# Patient Record
Sex: MALE | Race: WHITE | HISPANIC OR LATINO | Employment: FULL TIME | URBAN - METROPOLITAN AREA
[De-identification: names, ages, dates, MRNs, and addresses within clinical notes are randomized per-mention and may not be internally consistent; named-entity substitution may affect disease eponyms.]

---

## 2017-02-08 DIAGNOSIS — Z00.00 ROUTINE GENERAL MEDICAL EXAMINATION AT A HEALTH CARE FACILITY: Primary | ICD-10-CM

## 2017-04-13 ENCOUNTER — CLINICAL SUPPORT (OUTPATIENT)
Dept: INTERNAL MEDICINE | Facility: CLINIC | Age: 47
End: 2017-04-13
Payer: COMMERCIAL

## 2017-04-13 ENCOUNTER — OFFICE VISIT (OUTPATIENT)
Dept: INTERNAL MEDICINE | Facility: CLINIC | Age: 47
End: 2017-04-13
Payer: COMMERCIAL

## 2017-04-13 ENCOUNTER — HOSPITAL ENCOUNTER (OUTPATIENT)
Dept: CARDIOLOGY | Facility: CLINIC | Age: 47
Discharge: HOME | End: 2017-04-13
Payer: COMMERCIAL

## 2017-04-13 VITALS
SYSTOLIC BLOOD PRESSURE: 114 MMHG | WEIGHT: 176.56 LBS | HEART RATE: 60 BPM | DIASTOLIC BLOOD PRESSURE: 68 MMHG | BODY MASS INDEX: 27.66 KG/M2

## 2017-04-13 DIAGNOSIS — Z00.00 ROUTINE GENERAL MEDICAL EXAMINATION AT A HEALTH CARE FACILITY: Primary | ICD-10-CM

## 2017-04-13 DIAGNOSIS — Z80.42 FAMILY HISTORY OF PROSTATE CANCER: ICD-10-CM

## 2017-04-13 DIAGNOSIS — Z00.00 ROUTINE GENERAL MEDICAL EXAMINATION AT A HEALTH CARE FACILITY: ICD-10-CM

## 2017-04-13 LAB
ALBUMIN SERPL BCP-MCNC: 4 G/DL
ALP SERPL-CCNC: 93 U/L
ALT SERPL W/O P-5'-P-CCNC: 61 U/L
ANION GAP SERPL CALC-SCNC: 11 MMOL/L
AST SERPL-CCNC: 32 U/L
BILIRUB SERPL-MCNC: 0.8 MG/DL
BUN SERPL-MCNC: 15 MG/DL
CALCIUM SERPL-MCNC: 9.5 MG/DL
CHLORIDE SERPL-SCNC: 105 MMOL/L
CHOLEST/HDLC SERPL: 4.3 {RATIO}
CO2 SERPL-SCNC: 27 MMOL/L
COMPLEXED PSA SERPL-MCNC: 0.82 NG/ML
CREAT SERPL-MCNC: 1.2 MG/DL
ERYTHROCYTE [DISTWIDTH] IN BLOOD BY AUTOMATED COUNT: 12.9 %
EST. GFR  (AFRICAN AMERICAN): >60 ML/MIN/1.73 M^2
EST. GFR  (NON AFRICAN AMERICAN): >60 ML/MIN/1.73 M^2
ESTIMATED AVG GLUCOSE: 111 MG/DL
GLUCOSE SERPL-MCNC: 102 MG/DL
HBA1C MFR BLD HPLC: 5.5 %
HCT VFR BLD AUTO: 45.8 %
HDL/CHOLESTEROL RATIO: 23.4 %
HDLC SERPL-MCNC: 201 MG/DL
HDLC SERPL-MCNC: 47 MG/DL
HGB BLD-MCNC: 15.8 G/DL
LDLC SERPL CALC-MCNC: 123.8 MG/DL
MCH RBC QN AUTO: 30.9 PG
MCHC RBC AUTO-ENTMCNC: 34.5 %
MCV RBC AUTO: 90 FL
NONHDLC SERPL-MCNC: 154 MG/DL
PLATELET # BLD AUTO: 200 K/UL
PMV BLD AUTO: 10.1 FL
POTASSIUM SERPL-SCNC: 4.2 MMOL/L
PROT SERPL-MCNC: 7.5 G/DL
RBC # BLD AUTO: 5.11 M/UL
SODIUM SERPL-SCNC: 143 MMOL/L
TRIGL SERPL-MCNC: 151 MG/DL
TSH SERPL DL<=0.005 MIU/L-ACNC: 2.21 UIU/ML
WBC # BLD AUTO: 6.07 K/UL

## 2017-04-13 PROCEDURE — 83036 HEMOGLOBIN GLYCOSYLATED A1C: CPT

## 2017-04-13 PROCEDURE — 97802 MEDICAL NUTRITION INDIV IN: CPT | Mod: S$GLB,,, | Performed by: INTERNAL MEDICINE

## 2017-04-13 PROCEDURE — 93000 ELECTROCARDIOGRAM COMPLETE: CPT | Mod: S$GLB,,, | Performed by: INTERNAL MEDICINE

## 2017-04-13 PROCEDURE — 85027 COMPLETE CBC AUTOMATED: CPT

## 2017-04-13 PROCEDURE — 99386 PREV VISIT NEW AGE 40-64: CPT | Mod: S$GLB,,, | Performed by: INTERNAL MEDICINE

## 2017-04-13 PROCEDURE — 97750 PHYSICAL PERFORMANCE TEST: CPT | Mod: S$GLB,,, | Performed by: INTERNAL MEDICINE

## 2017-04-13 PROCEDURE — 84153 ASSAY OF PSA TOTAL: CPT

## 2017-04-13 PROCEDURE — 84443 ASSAY THYROID STIM HORMONE: CPT

## 2017-04-13 PROCEDURE — 80061 LIPID PANEL: CPT

## 2017-04-13 PROCEDURE — 80053 COMPREHEN METABOLIC PANEL: CPT

## 2017-04-13 PROCEDURE — 99999 PR PBB SHADOW E&M-EST. PATIENT-LVL III: CPT | Mod: PBBFAC,,, | Performed by: INTERNAL MEDICINE

## 2017-04-13 NOTE — PROGRESS NOTES
Subjective:       Patient ID: Enrique Jimenez is a 46 y.o. male.    Chief Complaint: Executive Health    HPIPleasant gentleman from [facility] here for his Executive Health exam. Overall doing well and had no specific complaints. We discussed the fact that his brother was recently diagnosed and treated for prostate cancer. His father also had this issue as well. Both have done well, but I stressed the importance of him getting yearly exams in this area. He denies any lower urinary symptoms including decreased stream. His PSA today was stable at 0.82 that represents no change from previous study. I gave him copies of his other blood work that showed a slight increase in ALT at 61 - new finding and cholesterol/triglycerides at 201/151 respectively - increased from last year. He reports not being as active physically this year 2o time constraints, but will make an effort to improve this. All other parameters were within normal limits including EKG.  Review of Systems   Constitutional: Negative for activity change, appetite change, fatigue and unexpected weight change.   HENT: Negative.    Eyes: Negative for visual disturbance.   Respiratory: Negative for cough, shortness of breath and wheezing.    Cardiovascular: Negative for chest pain, palpitations and leg swelling.   Gastrointestinal: Negative for abdominal distention, abdominal pain and blood in stool.   Endocrine: Negative.    Genitourinary: Negative for decreased urine volume, difficulty urinating and urgency.   Musculoskeletal: Negative for arthralgias, back pain and joint swelling.        Occasional heel pain.   Skin: Negative.    Neurological: Negative for dizziness, weakness, light-headedness and headaches.   Hematological: Negative.        Objective:      Physical Exam   Constitutional: He is oriented to person, place, and time. He appears well-developed and well-nourished. No distress.   HENT:   Head: Normocephalic and atraumatic.   Right Ear: External ear  normal.   Left Ear: External ear normal.   Mouth/Throat: Oropharynx is clear and moist. No oropharyngeal exudate.   Eyes: Conjunctivae and EOM are normal. Pupils are equal, round, and reactive to light. Right eye exhibits no discharge. Left eye exhibits no discharge. No scleral icterus.   Neck: Normal range of motion. Neck supple. No JVD present. No thyromegaly present.   Cardiovascular: Normal rate, regular rhythm, normal heart sounds and intact distal pulses.    No murmur heard.  Pulmonary/Chest: Effort normal and breath sounds normal. No respiratory distress. He has no wheezes. He exhibits no tenderness.   Abdominal: Soft. He exhibits no distension and no mass.   Musculoskeletal: Normal range of motion. He exhibits no edema or tenderness.   Lymphadenopathy:     He has no cervical adenopathy.   Neurological: He is alert and oriented to person, place, and time. No cranial nerve deficit. Coordination normal.   Skin: Skin is warm and dry. No rash noted. He is not diaphoretic. No erythema.   Psychiatric: He has a normal mood and affect. His behavior is normal. Judgment and thought content normal.   Nursing note and vitals reviewed.      Assessment:       1. Routine general medical examination at a health care facility    2. Family history of prostate cancer     3.    Out-of-range blood work as detailed above.   Plan:    1. Repeat abnormal labs in 1 month for comparison.         2. Repeat PSA in 1 year.         3. Return to clinic in 1 year or sooner as indicated above.

## 2017-04-13 NOTE — PROGRESS NOTES
Subjective:       Patient ID: Enrique Jimenez is a 46 y.o. male.    Chief Complaint: No chief complaint on file.    HPI   Mr. Jimenez has reported no previous history of cardiovascular or pulmonary disease. He has reported no physical limitations to exercise. He currently runs/walks 2 days per week for 50-60 minutes. Mr. Jimenez would like to avoid high impact activity because his knee does hurt after multiple days of running. He also avoids heavy continuous overhead lifting to avoid aggravation of his right shoulder.     Review of Systems    Objective:      The fitness evaluation results are as follows:    D.O.S. 4/13/2017 3/18/2016   Height (in): 67.5 68   Weight (lbs): 175 174   BMI: 27.68117 26.399033   Body Fat (%): 25.41 27.50   Waist (cm): 92 92   Hip (cm): 98 99   WHR: 0.94 0.93   RBP (mmHg): 126/80 118/76   RHR (bpm): 52 48    Strength R (lbs)t: 122 100    Strength Lt (lbs): 127 93.699725   Push-up Assessment: 40 37   Curl-up Assessment: 33 20   Flexibility Testing (cm): 29 30   REE (kcals): 1450 1460       Physical Exam    Assessment:      Age/Gender Stratified Assessment:       Heart Rate: Normal   Resting BP: Normal   Body Fat %: Fair   WHR Risk Factor: Low Risk    Strength R: Average    Strength L: Average   Upper Body Endurance: Excellent   Abdominal Endurance: Below Average   Lower body Flexibility: Very Good       1. Routine general medical examination at a health care facility        Plan:    Mr. Jimenez should continue with his current exercise routine striving to reach 150 minutes of moderate intensity aerobic exercise each week. He should begin resistance training to help increase his muscular strength and endurance. There was an increase in both endurance and strength that is associated with being more active in the previous months. Daily stretching should also be performed to increase level of flexibility. Mr. Jimenez should focus on maintaining his current fitness  level over the next year. A custom gym routine was created for Jb Jimenez to try in order to help keep him interested in working out.

## 2017-04-20 NOTE — PROGRESS NOTES
Nutrition Assessment  Client name:  Enrique Jimenez  :  1970  Age:  46 y.o.  Gender:  male    Client states: he met with another staff RD last year. Originally from [facility];  with three sons, ages 11, 14, and 16.  Has a hx of mixed hyperlipidemia. Last year he was running for exercise, but his knees now hurt, and has replaced it with walking and in future plans to ride his bike. His 16 year old son plays soccer and when he drops him off for practice he will walk/run for an hour. Additionally his son participates in traveling soccer and that promotes eating fast food on the weekend. On a positive note, since last year he has reduced sugar in beverages, drinking less oj, mainly water and prior to December had lost 6# and has maintained. He shares that he may be overeating peanuts, loves all kinds of chocolate, but has reduced amount/frequency Once his lipids were reviewed, he assessed that the contributors are: cheese, pastries and french fries. Stress from his job is high, working long hours and having survived the third round of layoffs. Today he is interested in losing a few additional pounds, getting back to a routine of exercise, and lowering his increased lipids.        Past Medical History:   Diagnosis Date    Mixed hyperlipidemia 2014       Social History    Marital status:    Employment:  [employer]  Social History   Substance Use Topics    Smoking status: Never Smoker    Smokeless tobacco: Not on file    Alcohol use Yes      Comment: Socially        Current medications:  has a current medication list which includes the following prescription(s): multivit-iron-min-folic acid and multivitamin.  Vitamins, minerals, and/or supplements:  Centrum, Vitamin D and Lecithin   Food allergies or intolerances: none     Food History  Breakfast:  ½ ham and cheese sandwich on whole grain bread  Or oatmeal + water  Lunch: 2x/wk restaurant: fish or burger with fries or office provided lunch +  "water or un sweet tea  Mid-afternoon Snack: (fruit available at work) banana/pears  Mid-afternoon Snack: 2 handfuls of shelled  Peanuts   Dinner: Turkey burger with quinoa and steamed vegetables  H.S. Snack:  Chocolate or homemade cake, cookies or Oreo's 3x/wk    Exercise History:  1x/wk resistance training 10 minutes + 1 hr.(25 minutes running, 35 minutes walking) 2x/wk    Lab Reports   Total Cholesterol:  201    Triglycerides:  151  HDL:  47  LDL:  125.8   Glucose:  102  HbA1c:  In process  BP:  126/80     Weight History  Height:  5'7.5"     Weight:  175  BMI:  27.06  % Body Fat:  25.41    Diagnosis  RMR (Method:  Body Kaleva):  1450 kcal  Activity Factor:  1.3  PAIGE:  1855 - 125 = 1730    Altered nutrition related laboratory values related to food choices as evidenced by Cholesterol: 201 and Triglyceride: 151    Intervention    Goals:  1.  Goal wt: 172# loss of 4#  2.  Purchase bike and ride 2x/wk for 45 minutes  3.  Chooses to reduce amount of restaurant food ordered/eaten not the choice of food  4.  Reduce Chol: <200 and Trig: < 151 and LDL <123    Discussed the benefits of regular exercise on lipids and fat loss. Explained the difference in fat of regular and light or reduced fat cheeses and how to read labels and brands of cheese to purchase. Reviewed what is a serving of various types of nuts and encouraged eating them however is measured portions according to package Explained additions to the "healthy" Fast Food guide to use while traveling and provided Healthy Travel snack guide. Offered and client accepted recipe for "perfect cookie" to replace current cookies. Discussed the benefits of 70% cacao chocolate how to distinguish and brands to seek. Encouraged  developing a task list to assure completing exercise.    Handouts provided:  Meal Planning Guide  Restaurant Guide  Eat Fit Shopping List  Eat Fit Nathalia  Fast Food Guide  Vitamin/Mineral Guide  Healthy Travel Snack Guide  Perfect Cookie " recipe    Monitoring/Evaluation    Monitor the following:  Weight  BMI  % Body Fat  Caloric intake  Labs:  Lipids    Follow Up Plan:  Follow up with client in 1-2 years

## 2017-08-04 ENCOUNTER — CLINICAL SUPPORT (OUTPATIENT)
Dept: INTERNAL MEDICINE | Facility: CLINIC | Age: 47
End: 2017-08-04
Payer: COMMERCIAL

## 2017-08-04 DIAGNOSIS — R74.01 ELEVATED AST (SGOT): Primary | ICD-10-CM

## 2017-08-04 LAB
ALBUMIN SERPL BCP-MCNC: 3.8 G/DL
ALP SERPL-CCNC: 91 U/L
ALT SERPL W/O P-5'-P-CCNC: 31 U/L
AST SERPL-CCNC: 23 U/L
BILIRUB DIRECT SERPL-MCNC: 0.3 MG/DL
BILIRUB SERPL-MCNC: 0.8 MG/DL
PROT SERPL-MCNC: 7.4 G/DL

## 2017-08-04 PROCEDURE — 36415 COLL VENOUS BLD VENIPUNCTURE: CPT

## 2017-08-04 PROCEDURE — 80076 HEPATIC FUNCTION PANEL: CPT

## 2018-02-15 ENCOUNTER — OFFICE VISIT (OUTPATIENT)
Dept: UROLOGY | Facility: CLINIC | Age: 48
End: 2018-02-15
Payer: COMMERCIAL

## 2018-02-15 VITALS
DIASTOLIC BLOOD PRESSURE: 88 MMHG | HEART RATE: 70 BPM | HEIGHT: 68 IN | BODY MASS INDEX: 27.06 KG/M2 | WEIGHT: 178.56 LBS | SYSTOLIC BLOOD PRESSURE: 151 MMHG

## 2018-02-15 DIAGNOSIS — N48.6 PEYRONIE'S DISEASE: Primary | ICD-10-CM

## 2018-02-15 DIAGNOSIS — N52.01 ERECTILE DYSFUNCTION DUE TO ARTERIAL INSUFFICIENCY: ICD-10-CM

## 2018-02-15 PROBLEM — Z80.42 FAMILY HISTORY OF PROSTATE CANCER: Status: RESOLVED | Noted: 2017-04-13 | Resolved: 2018-02-15

## 2018-02-15 PROCEDURE — 99204 OFFICE O/P NEW MOD 45 MIN: CPT | Mod: S$GLB,,, | Performed by: UROLOGY

## 2018-02-15 PROCEDURE — 99999 PR PBB SHADOW E&M-EST. PATIENT-LVL III: CPT | Mod: PBBFAC,,, | Performed by: UROLOGY

## 2018-02-15 PROCEDURE — 3008F BODY MASS INDEX DOCD: CPT | Mod: S$GLB,,, | Performed by: UROLOGY

## 2018-02-15 RX ORDER — SILDENAFIL CITRATE 20 MG/1
TABLET ORAL
Qty: 50 TABLET | Refills: 11 | Status: SHIPPED | OUTPATIENT
Start: 2018-02-15 | End: 2018-05-10

## 2018-02-15 RX ORDER — PENTOXIFYLLINE 400 MG/1
400 TABLET, EXTENDED RELEASE ORAL
Qty: 90 TABLET | Refills: 9 | Status: SHIPPED | OUTPATIENT
Start: 2018-02-15 | End: 2019-09-16

## 2018-02-15 NOTE — LETTER
February 15, 2018      Jason Pruitt MD  [address] [facility] [address]           [facility] - Urology 4th Floor  [address]  [place] [address]  Phone: [phone]          Patient: Enrique Jimenez   MR Number: [id number]   YOB: 1970   Date of Visit: 2/15/2018       Dear Dr. Jason Pruitt:    Thank you for referring Enrique Jimenez to me for evaluation. Attached you will find relevant portions of my assessment and plan of care.    If you have questions, please do not hesitate to call me. I look forward to following Enrique Jimenez along with you.    Sincerely,    Renzo Prakash MD    Enclosure  CC:  No Recipients    If you would like to receive this communication electronically, please contact [email or web address] or [phone] to request more information on tribr Link access.    For providers and/or their staff who would like to refer a patient to [facility], please contact us through our one-stop-shop provider referral line, [facility] [place], operated [facility], at [phone].    If you feel you have received this communication in error or would no longer like to receive these types of communications, please e-mail [email or web address]

## 2018-02-15 NOTE — PATIENT INSTRUCTIONS
What Is Peyronies Disease?  A slight natural curve to the erect penis is usually normal. But curvature that causes pain and difficulty with intercourse is a problem. The development of painful curvature is called Peyronies disease. Peyronies disease is due to a plaque (scar) that forms inside the penis.    Your Penile Anatomy  The shaft (body) of the penis consists of the corpora cavernosa, two columns of spongy tissue. During an erection, this tissue fills with blood, swells, and becomes rigid, creating an erection. A dense sheath of elastic tissue called the tunica surrounds the corpora. This tissue stretches as the penis becomes erect.    Painful Curvature  Peyronies disease occurs when a plaque (scar) develops on the fibrous sheath of tissue surrounding the corpora. The scar can form on any part of the penis, but often is found on the top or bottom. The scarred area of the tunica loses its elasticity, and so doesnt stretch when the corpora swell. Because the tunica doesnt stretch in that area, the erect penis curves in the direction of the scar.  Possible Causes   No one is sure just what causes the plaque. It may be the result of an injury to the erect penis or a blow to the groin. The plaque may also occur because of a problem with your immune system, which normally helps your body fight disease. Or the plaque may form for other, still unknown, reasons. It is certain, however, that Peyronies disease is not caused by sexually transmitted diseases and is not cancer.    Symptoms of Peyronies Disease  · Curvature of the penis during erection (which may interfere with intercourse)  · Pain during erection  · Soft erections  · Shortening or narrowing of the penis  A hard area is usually felt below the skin of the penis in the area of the plaque.  © 6588-9695 Joanne [employer]ie, [address] [place] [address], [place] [address]. All rights reserved. This information is not intended as a substitute for professional  medical care. Always follow your healthcare professional's instructions.

## 2018-02-15 NOTE — PROGRESS NOTES
CHIEF COMPLAINT:    Mr. Jimenez is a 47 y.o. male presenting for a consultation at the request of Dr. Pruitt. Patient presents with peyronie's disease    PRESENTING ILLNESS:    Enrique Jimenez is a 47 y.o. male who c/o peyronie's disease.  It's been present for 2 months.  He has ~ a 45 degree ventral curve.  He also notes that his penis is not as firm distally.  It's difficult to penetrate because of the ED, but he doesn't think it's that difficult because of the curve.  Sex is not painful for him or his wife.     He has no voiding complaints.  No hematuria.  No dysuria.    REVIEW OF SYSTEMS:    Enrique Jimenez denies headache, blurred vision, fever, nausea, vomiting, chills, abdominal pain, bleeding per rectum, cough, SOB, recent loss of consciousness, recent mental status changes, seizures, dizziness, or upper or lower extremity weakness.    YAJAIRA  1. 4  2. 5  3. 5  4. 5  5. 5      PATIENT HISTORY:    Past Medical History:   Diagnosis Date    Family history of prostate cancer 4/13/2017    Father and brother.    Mixed hyperlipidemia 11/25/2014       Past Surgical History:   Procedure Laterality Date    LIPOMA RESECTION         Family History   Problem Relation Age of Onset    Cancer Father 77     prostate cancer    Cancer Brother 58     Prostate cancer.       Social History     Social History    Marital status:      Spouse name: N/A    Number of children: N/A    Years of education: N/A     Occupational History    Not on file.     Social History Main Topics    Smoking status: Never Smoker    Smokeless tobacco: Not on file    Alcohol use Yes      Comment: Socially    Drug use: No    Sexual activity: Not on file     Other Topics Concern    Not on file     Social History Narrative    No narrative on file       Allergies:  Patient has no known allergies.    Medications:    Current Outpatient Prescriptions:     multivitamin (ONE DAILY MULTIVITAMIN) per tablet, Take 1 tablet by mouth once  daily., Disp: , Rfl:     PHYSICAL EXAMINATION:    The patient generally appears in good health, is appropriately interactive, and is in no apparent distress.     Eyes: anicteric sclerae, moist conjunctivae; no lid-lag; PERRLA     HENT: Atraumatic; oropharynx clear with moist mucous membranes and no mucosal ulcerations;normal hard and soft palate.  No evidence of lymphadenopathy.    Neck: Trachea midline.  No thyromegaly.    Musculoskeletal: No abnormal gait.    Skin: No lesions.    Mental: Cooperative with normal affect.  Is oriented to time, place, and person.    Neuro: Grossly intact.    Chest: Normal inspiratory effort.   No accessory muscles.  No audible wheezes.  Respirations symmetric on inspiration and expiration.    Heart: Regular rhythm.      Abdomen:  Soft, non-tender. No masses or organomegaly. Bladder is not palpable. No evidence of flank discomfort. No evidence of inguinal hernia.    Genitourinary: The penis is not circumcised with a plaque c/w peyronie's on the shaft. The urethral meatus is normal. The testes, epididymides, and cord structures are normal in size and contour bilaterally. The scrotum is normal in size and contour.    Normal anal sphincter tone. No rectal mass.    The prostate is 30 g. Normal landmarks. Lateral sulci. Median furrow intact.  No nodularity or induration. Seminal vesicles are normal.    Extremities: No clubbing, cyanosis, or edema      LABS:      Lab Results   Component Value Date    PSA 0.82 04/13/2017    PSA 0.81 03/18/2016    PSA 0.62 10/25/2013       IMPRESSION:    Encounter Diagnoses   Name Primary?    Peyronie's disease Yes    Erectile dysfunction due to arterial insufficiency          PLAN:    1. We discussed the pathophysiology of Peyronie's disease.  We discussed both the acute and chronic phase of the disease.  Because it has only been present for 2 months, I discussed that I feel he is in the acute phase of the disease.  While he is in the acute phase I would  recommend Trental.  Risks and benefits discussed.  Side effects discussed.  A new Rx for Trental was given today.   2. Will use generic sildenafil for the ED. Side effects discussed.  A new Rx was given  3. RTC 10 months.    Copy to: Sonal

## 2018-05-10 ENCOUNTER — OFFICE VISIT (OUTPATIENT)
Dept: INTERNAL MEDICINE | Facility: CLINIC | Age: 48
End: 2018-05-10
Payer: COMMERCIAL

## 2018-05-10 ENCOUNTER — CLINICAL SUPPORT (OUTPATIENT)
Dept: INTERNAL MEDICINE | Facility: CLINIC | Age: 48
End: 2018-05-10
Payer: COMMERCIAL

## 2018-05-10 VITALS
TEMPERATURE: 98 F | HEART RATE: 52 BPM | DIASTOLIC BLOOD PRESSURE: 82 MMHG | WEIGHT: 178 LBS | BODY MASS INDEX: 27.06 KG/M2 | SYSTOLIC BLOOD PRESSURE: 128 MMHG

## 2018-05-10 DIAGNOSIS — Z00.00 ROUTINE GENERAL MEDICAL EXAMINATION AT A HEALTH CARE FACILITY: Primary | ICD-10-CM

## 2018-05-10 LAB
ALBUMIN SERPL BCP-MCNC: 4.1 G/DL
ALP SERPL-CCNC: 88 U/L
ALT SERPL W/O P-5'-P-CCNC: 31 U/L
ANION GAP SERPL CALC-SCNC: 10 MMOL/L
AST SERPL-CCNC: 21 U/L
BILIRUB SERPL-MCNC: 0.7 MG/DL
BUN SERPL-MCNC: 15 MG/DL
CALCIUM SERPL-MCNC: 9.7 MG/DL
CHLORIDE SERPL-SCNC: 106 MMOL/L
CHOLEST SERPL-MCNC: 191 MG/DL
CHOLEST/HDLC SERPL: 4.3 {RATIO}
CO2 SERPL-SCNC: 27 MMOL/L
COMPLEXED PSA SERPL-MCNC: 0.93 NG/ML
CREAT SERPL-MCNC: 1.2 MG/DL
ERYTHROCYTE [DISTWIDTH] IN BLOOD BY AUTOMATED COUNT: 12.1 %
EST. GFR  (AFRICAN AMERICAN): >60 ML/MIN/1.73 M^2
EST. GFR  (NON AFRICAN AMERICAN): >60 ML/MIN/1.73 M^2
ESTIMATED AVG GLUCOSE: 105 MG/DL
GLUCOSE SERPL-MCNC: 98 MG/DL
HBA1C MFR BLD HPLC: 5.3 %
HCT VFR BLD AUTO: 47.3 %
HDLC SERPL-MCNC: 44 MG/DL
HDLC SERPL: 23 %
HGB BLD-MCNC: 15.8 G/DL
LDLC SERPL CALC-MCNC: 115.8 MG/DL
MCH RBC QN AUTO: 30.6 PG
MCHC RBC AUTO-ENTMCNC: 33.4 G/DL
MCV RBC AUTO: 92 FL
NONHDLC SERPL-MCNC: 147 MG/DL
PLATELET # BLD AUTO: 227 K/UL
PMV BLD AUTO: 10.2 FL
POTASSIUM SERPL-SCNC: 4.4 MMOL/L
PROT SERPL-MCNC: 7.5 G/DL
RBC # BLD AUTO: 5.16 M/UL
SODIUM SERPL-SCNC: 143 MMOL/L
TRIGL SERPL-MCNC: 156 MG/DL
TSH SERPL DL<=0.005 MIU/L-ACNC: 1.53 UIU/ML
WBC # BLD AUTO: 5.59 K/UL

## 2018-05-10 PROCEDURE — 84443 ASSAY THYROID STIM HORMONE: CPT

## 2018-05-10 PROCEDURE — 85027 COMPLETE CBC AUTOMATED: CPT

## 2018-05-10 PROCEDURE — 84153 ASSAY OF PSA TOTAL: CPT

## 2018-05-10 PROCEDURE — 97750 PHYSICAL PERFORMANCE TEST: CPT | Mod: S$GLB,,, | Performed by: INTERNAL MEDICINE

## 2018-05-10 PROCEDURE — 99396 PREV VISIT EST AGE 40-64: CPT | Mod: S$GLB,,, | Performed by: INTERNAL MEDICINE

## 2018-05-10 PROCEDURE — 80061 LIPID PANEL: CPT

## 2018-05-10 PROCEDURE — 99999 PR PBB SHADOW E&M-EST. PATIENT-LVL III: CPT | Mod: PBBFAC,,, | Performed by: INTERNAL MEDICINE

## 2018-05-10 PROCEDURE — 80053 COMPREHEN METABOLIC PANEL: CPT

## 2018-05-10 PROCEDURE — 83036 HEMOGLOBIN GLYCOSYLATED A1C: CPT

## 2018-05-10 PROCEDURE — 97802 MEDICAL NUTRITION INDIV IN: CPT | Mod: S$GLB,,, | Performed by: INTERNAL MEDICINE

## 2018-05-10 NOTE — PROGRESS NOTES
Nutrition Assessment  This is a general nutrition consultation as per the contractual agreement of the client employer's insurance provider.    Client name:  Enrique Jimenez  :  1970  Age:  47 y.o.  Gender:  male    Client states: Originally from [facility];  with three sons, ages 12, 15, and 17.  Has a hx of mixed hyperlipidemia. Last year he was running for exercise, but experiences knee pain and has reduced running time and frequency and accomplished one of last years goals of replacing it with biking. In the last 3 weeks he has not gone to the gym due to pain in his neck and shoulder. The MD visit revealed sore muscles and tendons probably related to resistance training. Last year he was eating peanuts, chocolate, cheese, pastries and french fries all which he enjoys, however in January he with the assistance of is wife, Francia, disciplined himself to eat less snacks, bring lunch to work 3x/wk, and a lighter dinner meals. These changes resulted in a 5# wt. He discontinued taking Lecithin as he did not see benefits.  Stress from his job continues and he has survived the fourth round of layoffs and re-organization. Today he has no specific nutrition questions and would like to continue with healthy eating habits.        Past Medical History:   Diagnosis Date    Family history of prostate cancer 2017    Father and brother.    Mixed hyperlipidemia 2014       Social History    Marital status:    Employment:  [employer]  Social History   Substance Use Topics    Smoking status: Never Smoker    Smokeless tobacco: Not on file    Alcohol use Yes      Comment: Socially/ whiskey or red wine 1x/mo        Current medications:  has a current medication list which includes the following prescription(s): multivitamin and pentoxifylline.  Vitamins, minerals, and/or supplements:  MVI   Food allergies or intolerances:  none     Food History  Breakfast:  Eggs, toast or oatmeal with fruit  +  "water  Mid-morning Snack:  none  Lunch:  Fish with broccoli, unsweetened tea  Mid-afternoon Snack:  Perfect cookie or yogurt  Dinner:  Salad with chicken r fish with homemade yogurt dressing, water  H.S. Snack:  one    Exercise History:  Biking 2x/wk - 15 minutes for 1.25 hrs, running 1x/wk for 2 miles (20 minutes), (not in last 3 weeks) 2x/wk resistance training 1 hr.    Lab Reports (unavailable at time of visit)  Total Cholesterol:  191    Triglycerides:  156  HDL:  44  LDL:  115.8   Glucose:  98  HbA1c:  5.3  BP:  110/70    Weight History  Height:  5'7.5"     Weight:  176  BMI:  27.22  % Body Fat:  25.59    Diagnosis  RMR (Method:  Body Rapelje):  1400 kcal  Activity Factor:  1.3  PAIGE:  1820    No nutrition related diagnosis at this time.    Intervention    Goals:  1.  Maintain wt lost: 176# or less  2.  Continue with lipids in healthy range  3.  Continue with aerobic exercise and re-start resistance training when advised  4.  Continue with healthy changes in food behaviors    Labs unavailable at time of visit. Re-visited Lipids from 2017. Congratulated client on achievement of wt. Loss and increasing exercise. He had no nutrition related questions. His wife was a practicing MD in [facility] and assists him with continuing his healthy habits. He has contributed to the development of the goals above and is motivated to accomplish them.    Handouts provided:  Meal Planning Guide  Restaurant Guide  Eat Fit Shopping List  Eat Fit Nathalia  Fast Food Guide  Vitamin/Mineral Guide    Monitoring/Evaluation    Monitor the following:  Weight  BMI  % Body Fat  Caloric intake  Labs:  Lipids    Follow Up Plan:  Follow up with client in 1-2 years  "

## 2018-05-10 NOTE — PROGRESS NOTES
Subjective:       Patient ID: Enrique Jimenez is a 47 y.o. male.    Chief Complaint: No chief complaint on file.    HPI   Patient has reported no previous hsitory of cardiovascular or pulmonary disease.     Physical Limitations:   - Left shoulder pain. Started in neck and is radiating down - deferred push ups    Current Exercise Routine:   - Runs 1.5 miles / Walks .5 mile - usually takes a little over 20 minutes. 2 days per week   - Bike - 60 minutes 1 day each week    Patient familiar with exercise and will begin resistance training again after his shoulder stops hurting. I encouraged him to follow up with a specialist if it continues to hurt for another week. Shoulder stretching protocol given.     Review of Systems    Objective:      The fitness evaluation results are as follows:    D.O.S. 5/10/2018 4/13/2017 3/18/2016   Height (in): 67.5 67.5 68   Weight (lbs): 176 175 174   BMI: 27.2957981 27.568665 26.854853   Body Fat (%): 26.59 25.41 27.50   Waist (cm): 93 92 92   Hip (cm): 100 98 99   WHR: 0.93 0.94 0.93   RBP (mmHg): 110/70 126/80 118/76   RHR (bpm): 46 52 48    Strength R (lbs)t: 120 122 100    Strength Lt (lbs): 126 127 93.265530   Push-up Assessment: Deferred 40 37   Curl-up Assessment: 39 33 20   Flexibility Testing (cm): 35 29 30   REE (kcals): 1400 1450 1460     Physical Exam    Assessment:      Age/Gender Stratified Assessment:     Resting BP: Within Testing Limits   Body Fat %: Fair   WHR Risk Factor: Low Risk    Strength R: Average    Strength L: Average   Upper Body Endurance: Deferred   Abdominal Endurance: Average   Lower body Flexibility: Excellent     1. Routine general medical examination at a health care facility        Plan:    Patient should focus on creating a balanced exercise routine following below guidelines. This will help to increase overall fitness level.     Recommended Fitness Guidelines:   - 150 minutes of moderate intensity aerobic exercise each week OR 75  minutes of vigorous    - 2-4 days per week of resistance training for each muscle group   - Daily stretching

## 2018-05-18 NOTE — PROGRESS NOTES
"Subjective:       Patient ID: Enrique Jimenez is a 47 y.o. male.    Chief Complaint: Executive Health    HPIPleasant gentleman originally from [facility] here for his Executive Health exam. Overall doing well and had no specific complaints. He reported, however, that he had 3 health issues of concern. He was seen in Urgent Care for chest pain. He underwent several cardiac studies including a stress test with echocardiogram. This study was negative for ischemia, but the echo portion of the study detected evidence of mild mitral valve prolapse. I do not have these records, but will request them to include them in his [facility] chart. He was also recently seen by Dr Prakash in Urology for possible Peyronie's. Mr Jimenez has recently noted some changes in this regard which was confirmed by Dr Prakash. Treatment options were discussed at the time and I reiterated some of these as well. He will monitor as it does not seem to interfere with his function. He also reported recent issues with left-sided neck pain. He was again seen at local clinic that diagnosed him with "torticollis" and  Prescription for Flexeril was provided. This medication helped some, but he felt drowsy for most of the following day and has not taken it since as it appears to be improving. Otherwise doing well.  I am including copies of his studies including blood work that was all within normal limits except triglycerides that were slightly elevated, but improved over previous results. All other parameters were unremarkable.    Review of Systems   All other systems reviewed and are negative.      Objective:      Physical Exam   Constitutional: He is oriented to person, place, and time. He appears well-developed and well-nourished. No distress.   HENT:   Head: Normocephalic and atraumatic.   Right Ear: External ear normal.   Left Ear: External ear normal.   Mouth/Throat: Oropharynx is clear and moist.   Eyes: Conjunctivae and EOM are normal. Pupils are " equal, round, and reactive to light. Right eye exhibits no discharge. Left eye exhibits no discharge. No scleral icterus.   Neck: Normal range of motion. Neck supple. No JVD present. No thyromegaly present.   Cardiovascular: Normal rate, regular rhythm, normal heart sounds and intact distal pulses.    No murmur heard.  Pulmonary/Chest: Effort normal and breath sounds normal. No respiratory distress. He has no wheezes. He exhibits no tenderness.   Abdominal: Soft. Bowel sounds are normal. He exhibits no distension and no mass. There is no tenderness.   Musculoskeletal: Normal range of motion. He exhibits no edema or tenderness.   Lymphadenopathy:     He has no cervical adenopathy.   Neurological: He is alert and oriented to person, place, and time. No cranial nerve deficit. Coordination normal.   Skin: Skin is warm and dry. No rash noted. He is not diaphoretic. No erythema.   Psychiatric: He has a normal mood and affect. His behavior is normal. Judgment and thought content normal.   Vitals reviewed.      Assessment:       1. Routine general medical examination at a health care facility        Plan:    1. Return to clinic in 1 year or sooner if needed.

## 2019-03-19 DIAGNOSIS — Z00.00 ROUTINE GENERAL MEDICAL EXAMINATION AT A HEALTH CARE FACILITY: Primary | ICD-10-CM

## 2019-05-10 ENCOUNTER — CLINICAL SUPPORT (OUTPATIENT)
Dept: INTERNAL MEDICINE | Facility: CLINIC | Age: 49
End: 2019-05-10
Payer: COMMERCIAL

## 2019-05-10 ENCOUNTER — HOSPITAL ENCOUNTER (OUTPATIENT)
Dept: CARDIOLOGY | Facility: CLINIC | Age: 49
Discharge: HOME | End: 2019-05-10
Attending: INTERNAL MEDICINE
Payer: COMMERCIAL

## 2019-05-10 ENCOUNTER — OFFICE VISIT (OUTPATIENT)
Dept: INTERNAL MEDICINE | Facility: CLINIC | Age: 49
End: 2019-05-10
Payer: COMMERCIAL

## 2019-05-10 VITALS
SYSTOLIC BLOOD PRESSURE: 118 MMHG | DIASTOLIC BLOOD PRESSURE: 80 MMHG | BODY MASS INDEX: 26.81 KG/M2 | WEIGHT: 176.31 LBS

## 2019-05-10 DIAGNOSIS — Z00.00 ROUTINE GENERAL MEDICAL EXAMINATION AT A HEALTH CARE FACILITY: ICD-10-CM

## 2019-05-10 DIAGNOSIS — Z00.00 ROUTINE GENERAL MEDICAL EXAMINATION AT A HEALTH CARE FACILITY: Primary | ICD-10-CM

## 2019-05-10 LAB
ALBUMIN SERPL BCP-MCNC: 4 G/DL (ref 3.5–5.2)
ALP SERPL-CCNC: 94 U/L (ref 55–135)
ALT SERPL W/O P-5'-P-CCNC: 29 U/L (ref 10–44)
ANION GAP SERPL CALC-SCNC: 7 MMOL/L (ref 8–16)
AST SERPL-CCNC: 21 U/L (ref 10–40)
BILIRUB SERPL-MCNC: 0.6 MG/DL (ref 0.1–1)
BUN SERPL-MCNC: 15 MG/DL (ref 6–20)
CALCIUM SERPL-MCNC: 9.8 MG/DL (ref 8.7–10.5)
CHLORIDE SERPL-SCNC: 108 MMOL/L (ref 95–110)
CHOLEST SERPL-MCNC: 174 MG/DL (ref 120–199)
CHOLEST/HDLC SERPL: 3.7 {RATIO} (ref 2–5)
CO2 SERPL-SCNC: 26 MMOL/L (ref 23–29)
COMPLEXED PSA SERPL-MCNC: 0.79 NG/ML (ref 0–4)
CREAT SERPL-MCNC: 1.2 MG/DL (ref 0.5–1.4)
CV STRESS BASE HR: 65 BPM
DIASTOLIC BLOOD PRESSURE: 71 MMHG
ERYTHROCYTE [DISTWIDTH] IN BLOOD BY AUTOMATED COUNT: 12.2 % (ref 11.5–14.5)
EST. GFR  (AFRICAN AMERICAN): >60 ML/MIN/1.73 M^2
EST. GFR  (NON AFRICAN AMERICAN): >60 ML/MIN/1.73 M^2
ESTIMATED AVG GLUCOSE: 108 MG/DL (ref 68–131)
GLUCOSE SERPL-MCNC: 104 MG/DL (ref 70–110)
HBA1C MFR BLD HPLC: 5.4 % (ref 4–5.6)
HCT VFR BLD AUTO: 48.4 % (ref 40–54)
HDLC SERPL-MCNC: 47 MG/DL (ref 40–75)
HDLC SERPL: 27 % (ref 20–50)
HGB BLD-MCNC: 16.2 G/DL (ref 14–18)
LDLC SERPL CALC-MCNC: 107 MG/DL (ref 63–159)
MCH RBC QN AUTO: 30.2 PG (ref 27–31)
MCHC RBC AUTO-ENTMCNC: 33.5 G/DL (ref 32–36)
MCV RBC AUTO: 90 FL (ref 82–98)
NONHDLC SERPL-MCNC: 127 MG/DL
OHS CV CPX 1 MINUTE RECOVERY HEART RATE: 153 BPM
OHS CV CPX 85 PERCENT MAX PREDICTED HEART RATE MALE: 145
OHS CV CPX ESTIMATED METS: 19
OHS CV CPX MAX PREDICTED HEART RATE: 171
OHS CV CPX PATIENT IS FEMALE: 0
OHS CV CPX PATIENT IS MALE: 1
OHS CV CPX PEAK DIASTOLIC BLOOD PRESSURE: 54 MMHG
OHS CV CPX PEAK HEAR RATE: 166 BPM
OHS CV CPX PEAK RATE PRESSURE PRODUCT: NORMAL
OHS CV CPX PEAK SYSTOLIC BLOOD PRESSURE: 236 MMHG
OHS CV CPX PERCENT MAX PREDICTED HEART RATE ACHIEVED: 97
OHS CV CPX PERCENT TARGET HEART RATE ACHIEVED: 114.48
OHS CV CPX RATE PRESSURE PRODUCT PRESENTING: 8515
OHS CV CPX TARGET HEART RATE: 145
PLATELET # BLD AUTO: 208 K/UL (ref 150–350)
PMV BLD AUTO: 10.1 FL (ref 9.2–12.9)
POTASSIUM SERPL-SCNC: 5.5 MMOL/L (ref 3.5–5.1)
PROT SERPL-MCNC: 7.5 G/DL (ref 6–8.4)
RBC # BLD AUTO: 5.36 M/UL (ref 4.6–6.2)
SODIUM SERPL-SCNC: 141 MMOL/L (ref 136–145)
STRESS ECHO POST EXERCISE DUR MIN: 11 MIN
STRESS ECHO POST EXERCISE DUR SEC: 0
SYSTOLIC BLOOD PRESSURE: 131 MMHG
TRIGL SERPL-MCNC: 100 MG/DL (ref 30–150)
TSH SERPL DL<=0.005 MIU/L-ACNC: 1.94 UIU/ML (ref 0.4–4)
WBC # BLD AUTO: 4.83 K/UL (ref 3.9–12.7)

## 2019-05-10 PROCEDURE — 93015 TREADMILL STRESS TEST (CUPID ONLY): ICD-10-PCS | Mod: S$GLB,,, | Performed by: INTERNAL MEDICINE

## 2019-05-10 PROCEDURE — 99396 PREV VISIT EST AGE 40-64: CPT | Mod: S$PBB,,, | Performed by: INTERNAL MEDICINE

## 2019-05-10 PROCEDURE — 83036 HEMOGLOBIN GLYCOSYLATED A1C: CPT

## 2019-05-10 PROCEDURE — 97802 PR MED NUTR THER, 1ST, INDIV, EA 15 MIN: ICD-10-PCS | Mod: S$GLB,,, | Performed by: INTERNAL MEDICINE

## 2019-05-10 PROCEDURE — 97802 MEDICAL NUTRITION INDIV IN: CPT | Mod: S$GLB,,, | Performed by: INTERNAL MEDICINE

## 2019-05-10 PROCEDURE — 99999 PR PBB SHADOW E&M-EST. PATIENT-LVL II: CPT | Mod: PBBFAC,,, | Performed by: INTERNAL MEDICINE

## 2019-05-10 PROCEDURE — 93015 CV STRESS TEST SUPVJ I&R: CPT | Mod: S$GLB,,, | Performed by: INTERNAL MEDICINE

## 2019-05-10 PROCEDURE — 99396 PR PREVENTIVE VISIT,EST,40-64: ICD-10-PCS | Mod: S$PBB,,, | Performed by: INTERNAL MEDICINE

## 2019-05-10 PROCEDURE — 85027 COMPLETE CBC AUTOMATED: CPT

## 2019-05-10 PROCEDURE — 97750 PR PHYSICAL PERFORMANCE TEST: ICD-10-PCS | Mod: S$GLB,,, | Performed by: INTERNAL MEDICINE

## 2019-05-10 PROCEDURE — 97750 PHYSICAL PERFORMANCE TEST: CPT | Mod: S$GLB,,, | Performed by: INTERNAL MEDICINE

## 2019-05-10 PROCEDURE — 80061 LIPID PANEL: CPT

## 2019-05-10 PROCEDURE — 99999 PR PBB SHADOW E&M-EST. PATIENT-LVL II: ICD-10-PCS | Mod: PBBFAC,,, | Performed by: INTERNAL MEDICINE

## 2019-05-10 PROCEDURE — 84153 ASSAY OF PSA TOTAL: CPT

## 2019-05-10 PROCEDURE — 84443 ASSAY THYROID STIM HORMONE: CPT

## 2019-05-10 PROCEDURE — 80053 COMPREHEN METABOLIC PANEL: CPT

## 2019-05-10 NOTE — PROGRESS NOTES
"Nutrition Assessment  Client name:  Enrique Jimenez  :  1970  Age:  48 y.o.  Gender:  male    Client states:  Very pleasant [employer] employee here for his annual Executive Health physical.  Originally from [facility] and is  with three sons, ages 13, 16, and 19.  Has no medical complaints this morning, denying significant changes in PMH over the past year.  Maintains an active lifestyle, [place] [employer] [place] in addition to cycling for 60-80 minutes on the weekends.  Limits dining out to weekends only, preparing meals with his wife during the work week.  Shares that his wife prepares foods that are low in salt and oil.  Brings leftovers for lunch almost daily and has made a greater effort in selecting healthier snack choices, recalling recent snacks of fruit and nuts.  Meals at home typically consist of arepas (cornmeal stuffed with ham and cheese), rice or quinoa, chicken, and/or vegetables.  Drinks mostly water, coffee, and unsweet tea if dining out.  Was told by MD this morning to "keep doing what you are doing" and so, has no specific nutrition-related questions or concerns at this time.      Anthropometrics  Height:  5' 7.5"     Weight:  175#  BMI:  27  % Body Fat:  22.11%    Clinical Signs/Symptoms  N/V/D:  None  Appetite (Good, Fair, or Poor):  Good      Past Medical History:   Diagnosis Date    Family history of prostate cancer 2017    Father and brother.    Mixed hyperlipidemia 2014       Past Surgical History:   Procedure Laterality Date    LIPOMA RESECTION         Medications    has a current medication list which includes the following prescription(s): pentoxifylline.    Vitamins, Minerals, and/or Supplements:  None (Formerly took a Men's One-A-Day although discontinued 8-9 months ago to determine effects.)    Food/Medication Interactions:  Reviewed     Food Allergies or Intolerances:  NKFA     Social History    Marital status:    Employment:  [employer]    Social " History     Tobacco Use    Smoking status: Never Smoker   Substance Use Topics    Alcohol use: Yes     Comment: Socially        Lab Reports   Total Cholesterol:  174    Triglycerides:  100  HDL:  47  LDL:  107   Glucose:  104  HbA1c:  5.4%  BP:  118/80     Food History  Breakfast:  Arepa made with cornmeal, ham, and chese/sandwich on multi grain bread + coffee with milk  Mid-morning Snack:  None  Lunch:  [employer] edna edna/chicken breast + broccoli + quinoa + water  Mid-afternoon Snack:  Fruit/nuts  Dinner:  Same as lunch above  H.S. Snack:  Water  *Fluid intake:  Coffee, water, unsweet tea    Exercise History:  60 minutes varied gym activities 2x/week + cycling 60-80 minutes 1x/week    Cultural/Spiritual/Personal Preferences:  Prefers [facility]    Support System:  Family    State of Change:  Action    Barriers to Change:  None    Diagnosis    No nutrition-related diagnosis at this time.    Intervention    RMR (Method:  Body Costilla):  1470 kcal  Activity Factor:  1.3  PAIGE:  1911 kcal    Goals:  1.  Maintain healthy body weight  2.  Adhere to exercise recommendations provided by EP  3.  Maintain healthy dietary behaviors    Nutrition Education  Reviewed CMP, lipid panel, and HbA1c, complimenting patient on continuous improvement in lipids over the past two years.  Fitness consult revealed 1# weight loss yet ~4.5% body fat reduction since last year.   Reviewed the benefits of physical activity and reinforced fitness recommendations provided by EP this morning.  Praised patient for limited dining out frequency, weekly meal planning and prepping, healthier snack and meal choices, and overall, health conscious behaviors, encouraging continuation of such.    Patient verbalized understanding of nutrition education and recommendations received.    Handouts Provided  Meal Planning Guide  Restaurant Guide  Eat Fit Shopping List  Eat Fit Nathalia  Fast Food Guide  Vitamin/Mineral Guide    Monitoring/Evaluation    Monitor  the following:  Weight  BMI  % Body Fat  Caloric intake  Lipid panel    Follow Up Plan:  Communication with referring healthcare provider is unnecessary at this time as patient presented as part of annual wellness exam.  However, will follow up with patient in 1-2 years.

## 2019-05-10 NOTE — PROGRESS NOTES
Subjective:       Patient ID: Enrique Jimenez is a 48 y.o. male.    Chief Complaint: No chief complaint on file.    HPI   Pt. Has no significant cardiovascular or pulmonary history.    Physical Limitations:  Patient was born with a shoulder girdle abnormality which limits him from performing any above the head exercises.      Current exercise routine:  Patient currently goes to the gym 2-3 days a week.  Patient will run for 20 minutes, row for 10 minutes, and elliptical for 10 minutes, 1-2 days a week.  Patient performs full body resistance training exercises, once a week.  Patient rides his bike for 80 minutes, once a week.  Patient stretches daily.    Goals:  Patient would like to maintain his current weight.  When I asked if he had a goal weight he stated that when he drops below 175, he start to look gaunt.    Fun Facts:  Patient was very friendly and engaged.  Patient was very receptive to all recommendations made.      Review of Systems    Objective:     The fitness evaluation results are as follows:  D.O.S. 5/10/2019 5/10/2018 4/13/2017 3/18/2016   Height (in): 67.5 67.5 67.5 68   Weight (lbs): 175 176 175 174   BMI: 27.638482 27.602070 27.930786 26.28917   Body Fat (%): 22.11 26.59 25.41 27.50   Waist (cm): 94 93 92 92   Hip (cm): 103 100 98 99   WHR: 0.91 0.93 0.94 0.93   RBP (mmHg): 118/78 110/70 126/80 118/76   RHR (bpm): 70 46 52 48    Strength R (lbs)t: 116.08475 120 122 100    Strength Lt (lbs): 118.39566 126 127 93.24386   Push-up Assessment: 40 Deferred 40 37   Curl-up Assessment: 44 39 33 20   Flexibility Testing (cm): 23 35 29 30   REE (kcals): 1470 1400 1450 1460         Physical Exam    Assessment:     Age/gender stratified assessment:  Resting BP: Within Normal Limits   Body Fat %: Good   WHR Risk Factor: Low Risk    Strength R: Average    Strength L: Average   Upper Body Endurance: Excellent   Abdominal Endurance: Average   Lower body Flexibiltiy: Fair         1. Routine  general medical examination at a health care facility        Plan:       Recommended fitness guidelines:    -150 minutes of moderate intensity aerobic exercise per week or 75 minutes of vigorous intensity aerobic exercise per week.    -2 to 4 days per week of resistance training for each muscle group.  Try to reach a minimum of 2 days a week of upper body, lower body, and core resistance training exercises.    -Daily stretching with a hold of at least 30 seconds per muscle group.  Practice the seated hamstring stretch, demonstrated during the evaluation, daily.

## 2019-05-15 NOTE — PROGRESS NOTES
Subjective:       Patient ID: Enrique Jimenez is a 48 y.o. male.    Chief Complaint: Executive Health    HPIMr Jimenez is a pleasant gentleman from [facility] here for his Executive Health exam. Overall doing well and had no specific complaints. He has lost a bit of weight as a result of exercise and diet and overall feels well. He is encouraged to continue on that track as he looks welli  I am sending copies of his studies including blood work that showed mild increase in potassium level - likely transitory and not clinically significant. All other parameters were within normal limits including negative stress test.  Review of Systems   Constitutional: Negative for activity change, appetite change, fatigue and unexpected weight change.   HENT: Negative.    Eyes: Negative for visual disturbance.   Respiratory: Negative for cough and shortness of breath.    Cardiovascular: Negative for chest pain, palpitations and leg swelling.   Gastrointestinal: Negative for abdominal distention, abdominal pain and blood in stool.   Genitourinary: Negative for difficulty urinating.   Musculoskeletal: Negative for arthralgias, back pain and joint swelling.   Skin: Negative.    Neurological: Negative for dizziness, weakness, light-headedness and headaches.   Hematological: Negative.        Objective:      Physical Exam   Constitutional: He is oriented to person, place, and time. He appears well-developed and well-nourished. No distress.   HENT:   Head: Normocephalic and atraumatic.   Right Ear: External ear normal.   Left Ear: External ear normal.   Mouth/Throat: Oropharynx is clear and moist. No oropharyngeal exudate.   Eyes: Pupils are equal, round, and reactive to light. Conjunctivae and EOM are normal. Right eye exhibits no discharge. Left eye exhibits no discharge.   Neck: Normal range of motion. Neck supple. No JVD present. No thyromegaly present.   Cardiovascular: Normal rate, regular rhythm, normal heart sounds and intact  distal pulses.   No murmur heard.  Pulmonary/Chest: Effort normal and breath sounds normal. No respiratory distress. He has no wheezes. He exhibits no tenderness.   Abdominal: Soft. Bowel sounds are normal. He exhibits no distension and no mass. There is no tenderness.   Musculoskeletal: Normal range of motion. He exhibits no edema.   Lymphadenopathy:     He has no cervical adenopathy.   Neurological: He is alert and oriented to person, place, and time. No cranial nerve deficit. Coordination normal.   Skin: Skin is warm and dry. No rash noted. He is not diaphoretic. No erythema.   Psychiatric: He has a normal mood and affect. His behavior is normal. Judgment and thought content normal.   Nursing note and vitals reviewed.      Assessment:       1. Routine general medical examination at a health care facility        Plan:    1. Return to clinic in 1 year or sooner if needed.

## 2019-09-13 ENCOUNTER — PATIENT OUTREACH (OUTPATIENT)
Dept: ADMINISTRATIVE | Facility: OTHER | Age: 49
End: 2019-09-13

## 2019-09-16 ENCOUNTER — OFFICE VISIT (OUTPATIENT)
Dept: CARDIOLOGY | Facility: CLINIC | Age: 49
End: 2019-09-16
Payer: COMMERCIAL

## 2019-09-16 VITALS
WEIGHT: 184.44 LBS | SYSTOLIC BLOOD PRESSURE: 128 MMHG | BODY MASS INDEX: 27.95 KG/M2 | HEIGHT: 68 IN | DIASTOLIC BLOOD PRESSURE: 82 MMHG

## 2019-09-16 DIAGNOSIS — Z91.89 AT RISK FOR CORONARY ARTERY DISEASE: ICD-10-CM

## 2019-09-16 DIAGNOSIS — R07.9 CHEST PAIN, UNSPECIFIED TYPE: Primary | ICD-10-CM

## 2019-09-16 PROCEDURE — 99999 PR PBB SHADOW E&M-EST. PATIENT-LVL III: CPT | Mod: PBBFAC,,, | Performed by: INTERNAL MEDICINE

## 2019-09-16 PROCEDURE — 93000 ELECTROCARDIOGRAM COMPLETE: CPT | Mod: S$GLB,,, | Performed by: INTERNAL MEDICINE

## 2019-09-16 PROCEDURE — 99214 PR OFFICE/OUTPT VISIT, EST, LEVL IV, 30-39 MIN: ICD-10-PCS | Mod: S$GLB,,, | Performed by: INTERNAL MEDICINE

## 2019-09-16 PROCEDURE — 99999 PR PBB SHADOW E&M-EST. PATIENT-LVL III: ICD-10-PCS | Mod: PBBFAC,,, | Performed by: INTERNAL MEDICINE

## 2019-09-16 PROCEDURE — 99214 OFFICE O/P EST MOD 30 MIN: CPT | Mod: S$GLB,,, | Performed by: INTERNAL MEDICINE

## 2019-09-16 PROCEDURE — 93000 EKG 12-LEAD: ICD-10-PCS | Mod: S$GLB,,, | Performed by: INTERNAL MEDICINE

## 2019-09-16 PROCEDURE — 3008F PR BODY MASS INDEX (BMI) DOCUMENTED: ICD-10-PCS | Mod: CPTII,S$GLB,, | Performed by: INTERNAL MEDICINE

## 2019-09-16 PROCEDURE — 3008F BODY MASS INDEX DOCD: CPT | Mod: CPTII,S$GLB,, | Performed by: INTERNAL MEDICINE

## 2019-09-16 NOTE — PROGRESS NOTES
Subjective:   @Patient ID:  Enrique Jimenez is a 48 y.o. male who presents for evaluation of Chest pain.     HPI:    Patient presents due to recurrent chest pain.  This jorge it started 3 weeks ago. Sightly toward the left side if the chest. It is intermittent and sometimes lasts the whole day. No exertional. He has been under a lot of stress at work  Recently which might be contributing some. The patient had similar episodes last year and had stress ECHO in 2018 which was normal. Echocardiogram was also normal      He stays active and run 20 minutes multiple times a week. Along with biking 1 -2 hour weekly.   He has FH of heart disease, dad  at 80s from MI.      Prior cardiovascular  Hx  --------------------------------    EST only  2019    .     This study is negative for myocardial ischemia.  · There were no arrhythmias during stress.  · The patient reported no symptoms during the stress test.  · The test was stopped secondary to fatigue.          Overall, the patient's exercise capacity was excellent tolerance (19 METs).    CAC score  0 in 2016      Patient Active Problem List    Diagnosis Date Noted    Peyronie's disease 02/15/2018    Erectile dysfunction due to arterial insufficiency 02/15/2018    Cerumen impaction 2014    Mixed hyperlipidemia 2014           Left Arm BP - Sitting: (P) 133/82  LAST HbA1c  Lab Results   Component Value Date    HGBA1C 5.4 05/10/2019       Lipid panel  Lab Results   Component Value Date    CHOL 174 05/10/2019    CHOL 191 05/10/2018    CHOL 201 (H) 2017     Lab Results   Component Value Date    HDL 47 05/10/2019    HDL 44 05/10/2018    HDL 47 2017     Lab Results   Component Value Date    LDLCALC 107.0 05/10/2019    LDLCALC 115.8 05/10/2018    LDLCALC 123.8 2017     Lab Results   Component Value Date    TRIG 100 05/10/2019    TRIG 156 (H) 05/10/2018    TRIG 151 (H) 2017     Lab Results   Component Value Date    CHOLHDL 27.0 05/10/2019     CHOLHDL 23.0 05/10/2018    CHOLHDL 23.4 04/13/2017            Review of Systems   Constitution: Negative for chills and fever.   HENT: Negative for hearing loss and nosebleeds.    Eyes: Negative for blurred vision.   Cardiovascular: Positive for chest pain. Negative for palpitations.   Respiratory: Negative for shortness of breath.    Hematologic/Lymphatic: Negative for bleeding problem.   Skin: Negative for itching.   Musculoskeletal: Negative for falls.   Gastrointestinal: Negative for abdominal pain.   Genitourinary: Negative for hematuria.   Neurological: Negative for dizziness and loss of balance.   Psychiatric/Behavioral: Negative for altered mental status and depression.       Objective:   Physical Exam   Constitutional: He is oriented to person, place, and time. He appears well-developed and well-nourished.   HENT:   Head: Normocephalic and atraumatic.   Eyes: Conjunctivae are normal.   Neck: Neck supple. No JVD present.   Cardiovascular: Normal rate, regular rhythm and normal heart sounds. Exam reveals no gallop and no friction rub.   No murmur heard.  Pulmonary/Chest: Effort normal and breath sounds normal. No stridor. No respiratory distress. He has no wheezes.   Neurological: He is alert and oriented to person, place, and time.   Skin: Skin is warm and dry.   Psychiatric: He has a normal mood and affect. His behavior is normal.       Assessment:     1. Chest pain, unspecified type    2. At risk for coronary artery disease        Plan:     - Patient with recurrent chest pain and multiple tests in the past were done without any findings. Given the relatively young age, will proceed with coronary CTA to evaluate the Coronary anatomy as well as detect any possible anomalies.  Patient was provided reassurance as well.     - EKG reviewed with ST-T wave changes.   - Continue with current medical plan and lifestyle changes.  Return sooner for concerns or questions. If symptoms persist go to the ED  I have  reviewed all pertinent data including patient's medical history in detail and updated the computerized patient record.     Orders Placed This Encounter   Procedures    CTA Cardiac     Standing Status:   Future     Standing Expiration Date:   9/16/2020     Order Specific Question:   Is the patient allergic to iodine or contrast? Has a steroid / antihistamine prep been administered?     Answer:   No     Order Specific Question:   Is the patient on ANY Metformin drug such as Glucophage/Glucovance?           Should be off drug 48 hours after contrast. Check renal function before restart.     Answer:   No     Order Specific Question:   History of Kidney Disease - including: decreased kidney function, dialysis, kidney transplay, single kidney, kidney cancer, kidney surgery?     Answer:   None     Order Specific Question:   Does the patient have high blood pressure requiring medical treatment?     Answer:   No     Order Specific Question:   Diabetes?     Answer:   No     Order Specific Question:   May the Radiologist modify the order per protocol to meet the clinical needs of the patient?     Answer:   Yes    IN OFFICE EKG 12-LEAD (to Muse)     Order Specific Question:   Diagnosis     Answer:   Other chest pain [786.59.ICD-9-CM]       Follow up as scheduled.     He expressed verbal understanding and agreed with the plan    Patient's Medications   New Prescriptions    No medications on file   Previous Medications    No medications on file   Modified Medications    No medications on file   Discontinued Medications    PENTOXIFYLLINE (TRENTAL) 400 MG TBSR    Take 1 tablet (400 mg total) by mouth 3 (three) times daily with meals.

## 2019-09-16 NOTE — PATIENT INSTRUCTIONS
Uncertain Causes of Chest Pain (Child)  Chest pain in children can have many causes. Most are not serious. Sometimes chest pain is caused by stress or anxiety. Your child may have chest pain from stomach acid reflux, or lots of coughing. Or the pain may be from inflammation of the cartilage and joints connecting the ribs to the breastbone (sternum). A child may have a hard time describing the pain, so it can be hard for you to figure out the cause. In many cases, the cause of chest pain is not known. Less than 2% of chest pain in children and teens is due to a real heart problem or cause.  Home care  The healthcare provider may prescribe medicines for pain or other symptoms, such as a cough. Follow all instructions for giving these medicines to your child. Dont give your child any medicines that the provider has not approved.  General care  · Allow your child to do normal activities, as advised by your healthcare provider and as tolerated by your child. If an activity makes the pain worse, have your child rest.  · Position your child so that he or she is as comfortable as possible when having chest pain. Change his or her position as needed.  · A cold pack may help if the healthcare provider thinks the pain is from an injury or inflammation. Most young children will not use a cold pack because they don't like the feel of the cold. Don't force your child to use one.  · Apply a covered heating pad set on warm--not hot--or a warm cloth to the chest for 20 minutes, 4 times a day. If the pain seems worse after several hours, stop using heat.  · Ask your provider about stretches for the chest muscles that may help ease pain.  · If the provider thinks the pain is from acid reflux, watch what your child eats. Limit junk food. Don't give your child a meal just before bedtime, and avoid large meals.  · Talk with your provider about the causes of your childs pain. The provider may advise other ways to ease  it.  · Acetaminophen or ibuprofen can be used to treat sore or strained chest muscles. Do not give your child aspirin unless told to do so by the healthcare provider.  Follow-up care  Follow up with your childs healthcare provider, or as advised.  Call 911  Call 911 if your child:  · Has severe shortness of breath or is turning blue (cyanosis)  · Faints or loses consciousness  · Has an abnormal heartbeat  When to seek medical advice  Call your healthcare provider right away if any of these occur:  · Your child is younger than 12 weeks and has a fever of 100.4°F (38°C) or higher because your baby may need to be seen by their healthcare provider.  · Your child has repeated fevers above 104°F (40°C) at any age.  · Your child is younger than 2 years old and their fever continues for more than 24 hours or your child is 2 years old and older and their fever continues for more than 3 days.  · Symptoms dont go away with medicine or other treatment  · Your child's symptoms worsen  · Trouble breathing  · Fast breathing  · Acting very ill  · Too weak to stand  · The pain is severe and lasts for a prolonged period  · Chest pain improves, but then worsens again  Date Last Reviewed: 12/24/2015  © 2661-4511 [employer]. [address] [place] [address], [place] [address]. All rights reserved. This information is not intended as a substitute for professional medical care. Always follow your healthcare professional's instructions.

## 2020-02-19 ENCOUNTER — OFFICE VISIT (OUTPATIENT)
Dept: URGENT CARE | Facility: CLINIC | Age: 50
End: 2020-02-19
Payer: COMMERCIAL

## 2020-02-19 VITALS
WEIGHT: 176 LBS | BODY MASS INDEX: 26.67 KG/M2 | SYSTOLIC BLOOD PRESSURE: 133 MMHG | RESPIRATION RATE: 19 BRPM | OXYGEN SATURATION: 97 % | HEART RATE: 62 BPM | DIASTOLIC BLOOD PRESSURE: 64 MMHG | HEIGHT: 68 IN | TEMPERATURE: 99 F

## 2020-02-19 DIAGNOSIS — J06.9 VIRAL URI: Primary | ICD-10-CM

## 2020-02-19 DIAGNOSIS — R52 GENERALIZED BODY ACHES: ICD-10-CM

## 2020-02-19 LAB
CTP QC/QA: YES
FLUAV AG NPH QL: NEGATIVE
FLUBV AG NPH QL: NEGATIVE

## 2020-02-19 PROCEDURE — 87804 POCT INFLUENZA A/B: ICD-10-PCS | Mod: 59,QW,S$GLB, | Performed by: NURSE PRACTITIONER

## 2020-02-19 PROCEDURE — 99214 OFFICE O/P EST MOD 30 MIN: CPT | Mod: 25,S$GLB,, | Performed by: NURSE PRACTITIONER

## 2020-02-19 PROCEDURE — 99214 PR OFFICE/OUTPT VISIT, EST, LEVL IV, 30-39 MIN: ICD-10-PCS | Mod: 25,S$GLB,, | Performed by: NURSE PRACTITIONER

## 2020-02-19 PROCEDURE — 87804 INFLUENZA ASSAY W/OPTIC: CPT | Mod: QW,S$GLB,, | Performed by: NURSE PRACTITIONER

## 2020-02-19 RX ORDER — FLUTICASONE PROPIONATE 50 MCG
2 SPRAY, SUSPENSION (ML) NASAL DAILY
Qty: 16 ML | Refills: 0 | Status: SHIPPED | OUTPATIENT
Start: 2020-02-19 | End: 2022-08-31

## 2020-02-19 NOTE — PROGRESS NOTES
"Subjective:       Patient ID: Enrique Jimenez is a 49 y.o. male.    Vitals:  height is 5' 8" (1.727 m) and weight is 79.8 kg (176 lb). His oral temperature is 98.6 °F (37 °C). His blood pressure is 133/64 and his pulse is 62. His respiration is 19 and oxygen saturation is 97%.     Chief Complaint: URI    Patient presents with complaint of sore throat with chills and body aches, runny nose, and a cough that was worse at night.  States symptoms x2 days.  States that he was around a lot of people over the weekend to had a cough and likely gave him a cold.  He is using Tylenol with some relief.    URI    This is a new problem. The current episode started in the past 7 days (2 days ago). The problem has been gradually worsening. There has been no fever. Associated symptoms include coughing, rhinorrhea and a sore throat. Pertinent negatives include no abdominal pain, chest pain, congestion, diarrhea, dysuria, ear pain, headaches, joint pain, joint swelling, nausea, neck pain, plugged ear sensation, rash, sinus pain, sneezing, swollen glands, vomiting or wheezing. He has tried acetaminophen for the symptoms. The treatment provided no relief.       Constitution: Negative for chills, fatigue and fever.   HENT: Positive for sore throat. Negative for ear pain, congestion and sinus pain.    Neck: Negative for neck pain and painful lymph nodes.   Cardiovascular: Negative for chest pain and leg swelling.   Eyes: Negative for double vision and blurred vision.   Respiratory: Positive for cough. Negative for shortness of breath and wheezing.    Gastrointestinal: Negative for abdominal pain, nausea, vomiting and diarrhea.   Genitourinary: Negative for dysuria, frequency and urgency.   Musculoskeletal: Positive for muscle ache. Negative for joint pain, joint swelling and muscle cramps.   Skin: Negative for color change, pale and rash.   Allergic/Immunologic: Negative for seasonal allergies and sneezing.   Neurological: Negative for " dizziness, history of vertigo, light-headedness, passing out and headaches.   Hematologic/Lymphatic: Negative for swollen lymph nodes, easy bruising/bleeding and history of blood clots. Does not bruise/bleed easily.   Psychiatric/Behavioral: Negative for nervous/anxious, sleep disturbance and depression. The patient is not nervous/anxious.        Objective:      Physical Exam   Constitutional: He is oriented to person, place, and time. He appears well-developed and well-nourished. He is cooperative.  Non-toxic appearance. He does not have a sickly appearance. He does not appear ill. No distress.   HENT:   Head: Normocephalic and atraumatic.   Right Ear: Hearing, tympanic membrane, external ear and ear canal normal.   Left Ear: Hearing, tympanic membrane, external ear and ear canal normal.   Nose: Rhinorrhea present. No mucosal edema or nasal deformity. No epistaxis. Right sinus exhibits no maxillary sinus tenderness and no frontal sinus tenderness. Left sinus exhibits no maxillary sinus tenderness and no frontal sinus tenderness.   Mouth/Throat: Uvula is midline, oropharynx is clear and moist and mucous membranes are normal. No trismus in the jaw. Normal dentition. No uvula swelling. No oropharyngeal exudate, posterior oropharyngeal edema or posterior oropharyngeal erythema.   Eyes: Conjunctivae and lids are normal. No scleral icterus.   Neck: Trachea normal, full passive range of motion without pain and phonation normal. Neck supple. No neck rigidity. No edema and no erythema present.   Cardiovascular: Normal rate, regular rhythm, normal heart sounds, intact distal pulses and normal pulses.   Pulmonary/Chest: Effort normal and breath sounds normal. No respiratory distress. He has no decreased breath sounds. He has no wheezes. He has no rhonchi.   Abdominal: Normal appearance.   Musculoskeletal: Normal range of motion. He exhibits no edema or deformity.   Lymphadenopathy:     He has no cervical adenopathy.  "  Neurological: He is alert and oriented to person, place, and time. He exhibits normal muscle tone. Coordination normal.   Skin: Skin is warm, dry, intact, not diaphoretic and not pale.   Psychiatric: He has a normal mood and affect. His speech is normal and behavior is normal. Judgment and thought content normal. Cognition and memory are normal.   Nursing note and vitals reviewed.    Results for orders placed or performed in visit on 02/19/20   POCT Influenza A/B   Result Value Ref Range    Rapid Influenza A Ag Negative Negative    Rapid Influenza B Ag Negative Negative     Acceptable Yes          Assessment:       1. Viral URI    2. Generalized body aches        Plan:         Viral URI  -     fluticasone propionate (FLONASE) 50 mcg/actuation nasal spray; 2 sprays (100 mcg total) by Each Nostril route once daily.  Dispense: 16 mL; Refill: 0    Generalized body aches  -     POCT Influenza A/B      Patient Instructions                                                            URI   If your condition worsens or fails to improve we recommend that you receive another evaluation at the ER immediately or contact your PCP to discuss your concerns or return here. You must understand that you've received an urgent care treatment only and that you may be released before all your medical problems are known or treated. You the patient will arrange for follouw care as instructed.   If we discussed that I think your illness is viral, it will not respond to antibiotics and will last 10-14 days.  Flonase (fluticasone) is a nasal spray which is available over the counter and may help with your symptoms.   Zyrtec D, Claritin D or Allegra D can also help with symptoms of congestion and drainage.   If you have hypertension avoid using the "D" which is the decongestant   If you just have drainage you can take plain zyrtec, claritin or allegra   If you just have a congested feeling you can take pseudoephedrine (unless " you have high blood pressure) which you have to sign for behind the counter. Do not buy the phenylephrine which is on the shelf as it is not effective   Rest and fluids are also important.   Tylenol or ibuprofen can also be used as directed for pain unless you have an allergy to them or medical condition such as stomach ulcers, kidney or liver disease or blood thinners etc for which you should not be taking these type of medications.   If you are flying in the next few days Afrin nose drops for the airplane flight upon take off and landing may help. Other than at those times refrain from using afrin.         Viral Upper Respiratory Illness (Adult)  You have a viral upper respiratory illness (URI), which is another term for the common cold. This illness is contagious during the first few days. It is spread through the air by coughing and sneezing. It may also be spread by direct contact (touching the sick person and then touching your own eyes, nose, or mouth). Frequent handwashing will decrease risk of spread. Most viral illnesses go away within 7 to 10 days with rest and simple home remedies. Sometimes the illness may last for several weeks. Antibiotics will not kill a virus, and they are generally not prescribed for this condition.    Home care  · If symptoms are severe, rest at home for the first 2 to 3 days. When you resume activity, don't let yourself get too tired.  · Avoid being exposed to cigarette smoke (yours or others).  · You may use acetaminophen or ibuprofen to control pain and fever, unless another medicine was prescribed. (Note: If you have chronic liver or kidney disease, have ever had a stomach ulcer or gastrointestinal bleeding, or are taking blood-thinning medicines, talk with your healthcare provider before using these medicines.) Aspirin should never be given to anyone under 18 years of age who is ill with a viral infection or fever. It may cause severe liver or brain damage.  · Your appetite  may be poor, so a light diet is fine. Avoid dehydration by drinking 6 to 8 glasses of fluids per day (water, soft drinks, juices, tea, or soup). Extra fluids will help loosen secretions in the nose and lungs.  · Over-the-counter cold medicines will not shorten the length of time youre sick, but they may be helpful for the following symptoms: cough, sore throat, and nasal and sinus congestion. (Note: Do not use decongestants if you have high blood pressure.)  Follow-up care  Follow up with your healthcare provider, or as advised.  When to seek medical advice  Call your healthcare provider right away if any of these occur:  · Cough with lots of colored sputum (mucus)  · Severe headache; face, neck, or ear pain  · Difficulty swallowing due to throat pain  · Fever of 100.4°F (38°C)  Call 911, or get immediate medical care  Call emergency services right away if any of these occur:  · Chest pain, shortness of breath, wheezing, or difficulty breathing  · Coughing up blood  · Inability to swallow due to throat pain  Date Last Reviewed: 9/13/2015  © 8430-9649 [employer]. [address] [place] [id number]. All rights reserved. This information is not intended as a substitute for professional medical care. Always follow your healthcare professional's instructions.

## 2020-02-19 NOTE — PATIENT INSTRUCTIONS
"                                                         URI   If your condition worsens or fails to improve we recommend that you receive another evaluation at the ER immediately or contact your PCP to discuss your concerns or return here. You must understand that you've received an urgent care treatment only and that you may be released before all your medical problems are known or treated. You the patient will arrange for follouwp care as instructed.   If we discussed that I think your illness is viral, it will not respond to antibiotics and will last 10-14 days.  Flonase (fluticasone) is a nasal spray which is available over the counter and may help with your symptoms.   Zyrtec D, Claritin D or Allegra D can also help with symptoms of congestion and drainage.   If you have hypertension avoid using the "D" which is the decongestant   If you just have drainage you can take plain zyrtec, claritin or allegra   If you just have a congested feeling you can take pseudoephedrine (unless you have high blood pressure) which you have to sign for behind the counter. Do not buy the phenylephrine which is on the shelf as it is not effective   Rest and fluids are also important.   Tylenol or ibuprofen can also be used as directed for pain unless you have an allergy to them or medical condition such as stomach ulcers, kidney or liver disease or blood thinners etc for which you should not be taking these type of medications.   If you are flying in the next few days Afrin nose drops for the airplane flight upon take off and landing may help. Other than at those times refrain from using afrin.         Viral Upper Respiratory Illness (Adult)  You have a viral upper respiratory illness (URI), which is another term for the common cold. This illness is contagious during the first few days. It is spread through the air by coughing and sneezing. It may also be spread by direct contact (touching the sick person and then touching your own " eyes, nose, or mouth). Frequent handwashing will decrease risk of spread. Most viral illnesses go away within 7 to 10 days with rest and simple home remedies. Sometimes the illness may last for several weeks. Antibiotics will not kill a virus, and they are generally not prescribed for this condition.    Home care  · If symptoms are severe, rest at home for the first 2 to 3 days. When you resume activity, don't let yourself get too tired.  · Avoid being exposed to cigarette smoke (yours or others).  · You may use acetaminophen or ibuprofen to control pain and fever, unless another medicine was prescribed. (Note: If you have chronic liver or kidney disease, have ever had a stomach ulcer or gastrointestinal bleeding, or are taking blood-thinning medicines, talk with your healthcare provider before using these medicines.) Aspirin should never be given to anyone under 18 years of age who is ill with a viral infection or fever. It may cause severe liver or brain damage.  · Your appetite may be poor, so a light diet is fine. Avoid dehydration by drinking 6 to 8 glasses of fluids per day (water, soft drinks, juices, tea, or soup). Extra fluids will help loosen secretions in the nose and lungs.  · Over-the-counter cold medicines will not shorten the length of time youre sick, but they may be helpful for the following symptoms: cough, sore throat, and nasal and sinus congestion. (Note: Do not use decongestants if you have high blood pressure.)  Follow-up care  Follow up with your healthcare provider, or as advised.  When to seek medical advice  Call your healthcare provider right away if any of these occur:  · Cough with lots of colored sputum (mucus)  · Severe headache; face, neck, or ear pain  · Difficulty swallowing due to throat pain  · Fever of 100.4°F (38°C)  Call 911, or get immediate medical care  Call emergency services right away if any of these occur:  · Chest pain, shortness of breath, wheezing, or difficulty  breathing  · Coughing up blood  · Inability to swallow due to throat pain  Date Last Reviewed: 9/13/2015  © 7339-1790 [employer]. [address] [place] [address], [place] [address]. All rights reserved. This information is not intended as a substitute for professional medical care. Always follow your healthcare professional's instructions.

## 2020-06-26 ENCOUNTER — LAB VISIT (OUTPATIENT)
Dept: PRIMARY CARE CLINIC | Facility: OTHER | Age: 50
End: 2020-06-26
Payer: COMMERCIAL

## 2020-06-26 DIAGNOSIS — Z03.818 ENCOUNTER FOR OBSERVATION FOR SUSPECTED EXPOSURE TO OTHER BIOLOGICAL AGENTS RULED OUT: ICD-10-CM

## 2020-06-26 PROCEDURE — U0003 INFECTIOUS AGENT DETECTION BY NUCLEIC ACID (DNA OR RNA); SEVERE ACUTE RESPIRATORY SYNDROME CORONAVIRUS 2 (SARS-COV-2) (CORONAVIRUS DISEASE [COVID-19]), AMPLIFIED PROBE TECHNIQUE, MAKING USE OF HIGH THROUGHPUT TECHNOLOGIES AS DESCRIBED BY CMS-2020-01-R: HCPCS

## 2020-06-30 LAB — SARS-COV-2 RNA RESP QL NAA+PROBE: NEGATIVE

## 2020-08-06 DIAGNOSIS — Z00.00 ROUTINE GENERAL MEDICAL EXAMINATION AT A HEALTH CARE FACILITY: Primary | ICD-10-CM

## 2020-08-28 ENCOUNTER — OFFICE VISIT (OUTPATIENT)
Dept: INTERNAL MEDICINE | Facility: CLINIC | Age: 50
End: 2020-08-28
Payer: COMMERCIAL

## 2020-08-28 ENCOUNTER — CLINICAL SUPPORT (OUTPATIENT)
Dept: INTERNAL MEDICINE | Facility: CLINIC | Age: 50
End: 2020-08-28
Payer: COMMERCIAL

## 2020-08-28 ENCOUNTER — HOSPITAL ENCOUNTER (OUTPATIENT)
Dept: CARDIOLOGY | Facility: CLINIC | Age: 50
Discharge: HOME | End: 2020-08-28
Payer: COMMERCIAL

## 2020-08-28 DIAGNOSIS — Z00.00 ROUTINE GENERAL MEDICAL EXAMINATION AT A HEALTH CARE FACILITY: Primary | ICD-10-CM

## 2020-08-28 DIAGNOSIS — Z00.00 ROUTINE GENERAL MEDICAL EXAMINATION AT A HEALTH CARE FACILITY: ICD-10-CM

## 2020-08-28 LAB
ALBUMIN SERPL BCP-MCNC: 4.2 G/DL (ref 3.5–5.2)
ALP SERPL-CCNC: 85 U/L (ref 55–135)
ALT SERPL W/O P-5'-P-CCNC: 32 U/L (ref 10–44)
ANION GAP SERPL CALC-SCNC: 7 MMOL/L (ref 8–16)
AST SERPL-CCNC: 25 U/L (ref 10–40)
BILIRUB SERPL-MCNC: 0.7 MG/DL (ref 0.1–1)
BUN SERPL-MCNC: 14 MG/DL (ref 6–20)
CALCIUM SERPL-MCNC: 9.5 MG/DL (ref 8.7–10.5)
CHLORIDE SERPL-SCNC: 106 MMOL/L (ref 95–110)
CHOLEST SERPL-MCNC: 176 MG/DL (ref 120–199)
CHOLEST/HDLC SERPL: 3.8 {RATIO} (ref 2–5)
CO2 SERPL-SCNC: 29 MMOL/L (ref 23–29)
COMPLEXED PSA SERPL-MCNC: 0.94 NG/ML (ref 0–4)
CREAT SERPL-MCNC: 1.1 MG/DL (ref 0.5–1.4)
ERYTHROCYTE [DISTWIDTH] IN BLOOD BY AUTOMATED COUNT: 12 % (ref 11.5–14.5)
EST. GFR  (AFRICAN AMERICAN): >60 ML/MIN/1.73 M^2
EST. GFR  (NON AFRICAN AMERICAN): >60 ML/MIN/1.73 M^2
ESTIMATED AVG GLUCOSE: 108 MG/DL (ref 68–131)
GLUCOSE SERPL-MCNC: 101 MG/DL (ref 70–110)
HBA1C MFR BLD HPLC: 5.4 % (ref 4–5.6)
HCT VFR BLD AUTO: 48.1 % (ref 40–54)
HDLC SERPL-MCNC: 46 MG/DL (ref 40–75)
HDLC SERPL: 26.1 % (ref 20–50)
HGB BLD-MCNC: 15.8 G/DL (ref 14–18)
LDLC SERPL CALC-MCNC: 104.4 MG/DL (ref 63–159)
MCH RBC QN AUTO: 30.3 PG (ref 27–31)
MCHC RBC AUTO-ENTMCNC: 32.8 G/DL (ref 32–36)
MCV RBC AUTO: 92 FL (ref 82–98)
NONHDLC SERPL-MCNC: 130 MG/DL
PLATELET # BLD AUTO: 204 K/UL (ref 150–350)
PMV BLD AUTO: 10 FL (ref 9.2–12.9)
POTASSIUM SERPL-SCNC: 5 MMOL/L (ref 3.5–5.1)
PROT SERPL-MCNC: 7.4 G/DL (ref 6–8.4)
RBC # BLD AUTO: 5.22 M/UL (ref 4.6–6.2)
SODIUM SERPL-SCNC: 142 MMOL/L (ref 136–145)
TRIGL SERPL-MCNC: 128 MG/DL (ref 30–150)
TSH SERPL DL<=0.005 MIU/L-ACNC: 2.1 UIU/ML (ref 0.4–4)
WBC # BLD AUTO: 4.83 K/UL (ref 3.9–12.7)

## 2020-08-28 PROCEDURE — 85027 COMPLETE CBC AUTOMATED: CPT

## 2020-08-28 PROCEDURE — 83036 HEMOGLOBIN GLYCOSYLATED A1C: CPT

## 2020-08-28 PROCEDURE — 93005 ELECTROCARDIOGRAM TRACING: CPT | Mod: S$GLB,,, | Performed by: INTERNAL MEDICINE

## 2020-08-28 PROCEDURE — 93010 ELECTROCARDIOGRAM REPORT: CPT | Mod: S$GLB,,, | Performed by: INTERNAL MEDICINE

## 2020-08-28 PROCEDURE — 99386 PR PREVENTIVE VISIT,NEW,40-64: ICD-10-PCS | Mod: S$GLB,,, | Performed by: INTERNAL MEDICINE

## 2020-08-28 PROCEDURE — 80053 COMPREHEN METABOLIC PANEL: CPT

## 2020-08-28 PROCEDURE — 80061 LIPID PANEL: CPT

## 2020-08-28 PROCEDURE — 99999 PR PBB SHADOW E&M-EST. PATIENT-LVL II: ICD-10-PCS | Mod: PBBFAC,,, | Performed by: INTERNAL MEDICINE

## 2020-08-28 PROCEDURE — 99999 PR PBB SHADOW E&M-EST. PATIENT-LVL II: CPT | Mod: PBBFAC,,, | Performed by: INTERNAL MEDICINE

## 2020-08-28 PROCEDURE — 84443 ASSAY THYROID STIM HORMONE: CPT

## 2020-08-28 PROCEDURE — 93010 EKG 12-LEAD: ICD-10-PCS | Mod: S$GLB,,, | Performed by: INTERNAL MEDICINE

## 2020-08-28 PROCEDURE — 84153 ASSAY OF PSA TOTAL: CPT

## 2020-08-28 PROCEDURE — 93005 EKG 12-LEAD: ICD-10-PCS | Mod: S$GLB,,, | Performed by: INTERNAL MEDICINE

## 2020-08-28 PROCEDURE — 99386 PREV VISIT NEW AGE 40-64: CPT | Mod: S$GLB,,, | Performed by: INTERNAL MEDICINE

## 2020-09-02 VITALS — DIASTOLIC BLOOD PRESSURE: 78 MMHG | HEART RATE: 64 BPM | SYSTOLIC BLOOD PRESSURE: 118 MMHG

## 2020-09-02 NOTE — PROGRESS NOTES
Subjective:       Patient ID: Enrique Jimenez is a 49 y.o. male.    Chief Complaint: Executive Health    HPI Mr Jimenez is here today for his Executive Health exam. Overall doing well and had no specific complaints. He has a history of mid epigastric/ chest pains and has undergone several studies that have not shown a cardiac source. He is scheduled to undergo both a colonoscopy as well as an upper endoscopy for evaluation. He will make these reports available to me once completed. Otherwise doing well.  I ma sending copies of his studies including blood work that was all unremarkable including EKG that was unchanged.  Review of Systems   Constitutional: Negative for activity change, appetite change, chills, fatigue, fever and unexpected weight change.   Respiratory: Negative for cough, shortness of breath and wheezing.    Cardiovascular: Negative for chest pain, palpitations and leg swelling.   Gastrointestinal: Negative for abdominal distention, abdominal pain and blood in stool.   Genitourinary: Negative for difficulty urinating.   Musculoskeletal: Negative.    Neurological: Negative.    Hematological: Negative.          Objective:      Physical Exam  Vitals signs and nursing note reviewed.   Constitutional:       General: He is not in acute distress.     Appearance: Normal appearance. He is normal weight.   HENT:      Head: Normocephalic and atraumatic.      Right Ear: Tympanic membrane, ear canal and external ear normal. There is no impacted cerumen.      Left Ear: Tympanic membrane, ear canal and external ear normal. There is no impacted cerumen.   Eyes:      General:         Left eye: No discharge.      Extraocular Movements: Extraocular movements intact.      Conjunctiva/sclera: Conjunctivae normal.      Pupils: Pupils are equal, round, and reactive to light.   Neck:      Musculoskeletal: Normal range of motion and neck supple. No neck rigidity or muscular tenderness.      Vascular: No carotid bruit.    Cardiovascular:      Rate and Rhythm: Normal rate and regular rhythm.      Pulses: Normal pulses.      Heart sounds: Normal heart sounds. No murmur.   Pulmonary:      Effort: Pulmonary effort is normal. No respiratory distress.      Breath sounds: Normal breath sounds. No wheezing.   Abdominal:      General: Abdomen is flat. Bowel sounds are normal. There is no distension.      Palpations: Abdomen is soft. There is no mass.      Tenderness: There is no abdominal tenderness.   Musculoskeletal: Normal range of motion.         General: No swelling or tenderness.      Right lower leg: No edema.      Left lower leg: No edema.   Lymphadenopathy:      Cervical: No cervical adenopathy.   Skin:     General: Skin is warm and dry.      Findings: No erythema, lesion or rash.   Neurological:      General: No focal deficit present.      Mental Status: He is alert and oriented to person, place, and time.      Cranial Nerves: No cranial nerve deficit.      Coordination: Coordination normal.      Gait: Gait normal.   Psychiatric:         Mood and Affect: Mood normal.         Thought Content: Thought content normal.         Judgment: Judgment normal.         Assessment:       1. Routine general medical examination at a health care facility      2.    Probable GERD issues by history.  Plan:    1. Obtain EGD/colonoscopy report to include in his records.         2. Return to clinic in 1 year or sooner if needed.

## 2021-06-10 DIAGNOSIS — Z00.00 ROUTINE GENERAL MEDICAL EXAMINATION AT A HEALTH CARE FACILITY: Primary | ICD-10-CM

## 2021-07-15 ENCOUNTER — DOCUMENTATION ONLY (OUTPATIENT)
Dept: INTERNAL MEDICINE | Facility: CLINIC | Age: 51
End: 2021-07-15

## 2021-07-15 ENCOUNTER — LAB VISIT (OUTPATIENT)
Dept: INTERNAL MEDICINE | Facility: CLINIC | Age: 51
End: 2021-07-15
Payer: COMMERCIAL

## 2021-07-15 DIAGNOSIS — Z20.822 ENCOUNTER FOR LABORATORY TESTING FOR COVID-19 VIRUS: ICD-10-CM

## 2021-07-15 LAB — SARS-COV-2 RNA RESP QL NAA+PROBE: NOT DETECTED

## 2021-07-15 PROCEDURE — U0003 INFECTIOUS AGENT DETECTION BY NUCLEIC ACID (DNA OR RNA); SEVERE ACUTE RESPIRATORY SYNDROME CORONAVIRUS 2 (SARS-COV-2) (CORONAVIRUS DISEASE [COVID-19]), AMPLIFIED PROBE TECHNIQUE, MAKING USE OF HIGH THROUGHPUT TECHNOLOGIES AS DESCRIBED BY CMS-2020-01-R: HCPCS | Performed by: INTERNAL MEDICINE

## 2021-07-15 PROCEDURE — U0005 INFEC AGEN DETEC AMPLI PROBE: HCPCS | Performed by: INTERNAL MEDICINE

## 2021-08-18 ENCOUNTER — LAB VISIT (OUTPATIENT)
Dept: INTERNAL MEDICINE | Facility: CLINIC | Age: 51
End: 2021-08-18
Payer: COMMERCIAL

## 2021-08-18 ENCOUNTER — DOCUMENTATION ONLY (OUTPATIENT)
Dept: INTERNAL MEDICINE | Facility: CLINIC | Age: 51
End: 2021-08-18

## 2021-08-18 DIAGNOSIS — Z20.822 ENCOUNTER FOR LABORATORY TESTING FOR COVID-19 VIRUS: ICD-10-CM

## 2021-08-18 LAB — SARS-COV-2 RNA RESP QL NAA+PROBE: NOT DETECTED

## 2021-08-18 PROCEDURE — U0003 INFECTIOUS AGENT DETECTION BY NUCLEIC ACID (DNA OR RNA); SEVERE ACUTE RESPIRATORY SYNDROME CORONAVIRUS 2 (SARS-COV-2) (CORONAVIRUS DISEASE [COVID-19]), AMPLIFIED PROBE TECHNIQUE, MAKING USE OF HIGH THROUGHPUT TECHNOLOGIES AS DESCRIBED BY CMS-2020-01-R: HCPCS | Performed by: INTERNAL MEDICINE

## 2021-08-18 PROCEDURE — U0005 INFEC AGEN DETEC AMPLI PROBE: HCPCS | Performed by: INTERNAL MEDICINE

## 2021-09-08 DIAGNOSIS — Z00.00 ROUTINE GENERAL MEDICAL EXAMINATION AT A HEALTH CARE FACILITY: Primary | ICD-10-CM

## 2021-09-17 ENCOUNTER — PATIENT MESSAGE (OUTPATIENT)
Dept: INTERNAL MEDICINE | Facility: CLINIC | Age: 51
End: 2021-09-17

## 2021-09-17 ENCOUNTER — CLINICAL SUPPORT (OUTPATIENT)
Dept: INTERNAL MEDICINE | Facility: CLINIC | Age: 51
End: 2021-09-17
Payer: COMMERCIAL

## 2021-09-17 ENCOUNTER — OFFICE VISIT (OUTPATIENT)
Dept: INTERNAL MEDICINE | Facility: CLINIC | Age: 51
End: 2021-09-17
Payer: COMMERCIAL

## 2021-09-17 ENCOUNTER — HOSPITAL ENCOUNTER (OUTPATIENT)
Dept: CARDIOLOGY | Facility: CLINIC | Age: 51
Discharge: HOME | End: 2021-09-17
Payer: COMMERCIAL

## 2021-09-17 VITALS
HEIGHT: 68 IN | HEART RATE: 63 BPM | BODY MASS INDEX: 27.87 KG/M2 | SYSTOLIC BLOOD PRESSURE: 114 MMHG | OXYGEN SATURATION: 97 % | WEIGHT: 183.88 LBS | DIASTOLIC BLOOD PRESSURE: 66 MMHG

## 2021-09-17 DIAGNOSIS — Z00.00 ANNUAL PHYSICAL EXAM: Primary | ICD-10-CM

## 2021-09-17 DIAGNOSIS — Z00.00 ROUTINE PHYSICAL EXAMINATION: Primary | ICD-10-CM

## 2021-09-17 DIAGNOSIS — Z00.00 ROUTINE GENERAL MEDICAL EXAMINATION AT A HEALTH CARE FACILITY: ICD-10-CM

## 2021-09-17 LAB
ALBUMIN SERPL BCP-MCNC: 4.2 G/DL (ref 3.5–5.2)
ALP SERPL-CCNC: 90 U/L (ref 55–135)
ALT SERPL W/O P-5'-P-CCNC: 37 U/L (ref 10–44)
ANION GAP SERPL CALC-SCNC: 12 MMOL/L (ref 8–16)
AST SERPL-CCNC: 24 U/L (ref 10–40)
BILIRUB SERPL-MCNC: 1 MG/DL (ref 0.1–1)
BUN SERPL-MCNC: 13 MG/DL (ref 6–20)
CALCIUM SERPL-MCNC: 9.8 MG/DL (ref 8.7–10.5)
CHLORIDE SERPL-SCNC: 102 MMOL/L (ref 95–110)
CHOLEST SERPL-MCNC: 218 MG/DL (ref 120–199)
CHOLEST/HDLC SERPL: 3.9 {RATIO} (ref 2–5)
CO2 SERPL-SCNC: 27 MMOL/L (ref 23–29)
COMPLEXED PSA SERPL-MCNC: 0.95 NG/ML (ref 0–4)
CREAT SERPL-MCNC: 1 MG/DL (ref 0.5–1.4)
ERYTHROCYTE [DISTWIDTH] IN BLOOD BY AUTOMATED COUNT: 12.5 % (ref 11.5–14.5)
EST. GFR  (AFRICAN AMERICAN): >60 ML/MIN/1.73 M^2
EST. GFR  (NON AFRICAN AMERICAN): >60 ML/MIN/1.73 M^2
ESTIMATED AVG GLUCOSE: 111 MG/DL (ref 68–131)
GLUCOSE SERPL-MCNC: 103 MG/DL (ref 70–110)
HBA1C MFR BLD: 5.5 % (ref 4–5.6)
HCT VFR BLD AUTO: 48.9 % (ref 40–54)
HDLC SERPL-MCNC: 56 MG/DL (ref 40–75)
HDLC SERPL: 25.7 % (ref 20–50)
HGB BLD-MCNC: 16.4 G/DL (ref 14–18)
LDLC SERPL CALC-MCNC: 131 MG/DL (ref 63–159)
MCH RBC QN AUTO: 30.1 PG (ref 27–31)
MCHC RBC AUTO-ENTMCNC: 33.5 G/DL (ref 32–36)
MCV RBC AUTO: 90 FL (ref 82–98)
NONHDLC SERPL-MCNC: 162 MG/DL
PLATELET # BLD AUTO: 222 K/UL (ref 150–450)
PMV BLD AUTO: 9.8 FL (ref 9.2–12.9)
POTASSIUM SERPL-SCNC: 4.2 MMOL/L (ref 3.5–5.1)
PROT SERPL-MCNC: 7.6 G/DL (ref 6–8.4)
RBC # BLD AUTO: 5.45 M/UL (ref 4.6–6.2)
SODIUM SERPL-SCNC: 141 MMOL/L (ref 136–145)
TRIGL SERPL-MCNC: 155 MG/DL (ref 30–150)
TSH SERPL DL<=0.005 MIU/L-ACNC: 2.12 UIU/ML (ref 0.4–4)
WBC # BLD AUTO: 5.64 K/UL (ref 3.9–12.7)

## 2021-09-17 PROCEDURE — 84153 ASSAY OF PSA TOTAL: CPT | Performed by: INTERNAL MEDICINE

## 2021-09-17 PROCEDURE — 36415 COLL VENOUS BLD VENIPUNCTURE: CPT | Performed by: INTERNAL MEDICINE

## 2021-09-17 PROCEDURE — 93005 EKG 12-LEAD: ICD-10-PCS | Mod: S$GLB,,, | Performed by: INTERNAL MEDICINE

## 2021-09-17 PROCEDURE — 99999 PR PBB SHADOW E&M-EST. PATIENT-LVL III: ICD-10-PCS | Mod: PBBFAC,,, | Performed by: INTERNAL MEDICINE

## 2021-09-17 PROCEDURE — 1159F PR MEDICATION LIST DOCUMENTED IN MEDICAL RECORD: ICD-10-PCS | Mod: CPTII,S$GLB,, | Performed by: INTERNAL MEDICINE

## 2021-09-17 PROCEDURE — 3008F PR BODY MASS INDEX (BMI) DOCUMENTED: ICD-10-PCS | Mod: CPTII,S$GLB,, | Performed by: INTERNAL MEDICINE

## 2021-09-17 PROCEDURE — 93010 EKG 12-LEAD: ICD-10-PCS | Mod: S$GLB,,, | Performed by: INTERNAL MEDICINE

## 2021-09-17 PROCEDURE — 93005 ELECTROCARDIOGRAM TRACING: CPT | Mod: S$GLB,,, | Performed by: INTERNAL MEDICINE

## 2021-09-17 PROCEDURE — 99999 PR PBB SHADOW E&M-EST. PATIENT-LVL III: CPT | Mod: PBBFAC,,, | Performed by: INTERNAL MEDICINE

## 2021-09-17 PROCEDURE — 3074F SYST BP LT 130 MM HG: CPT | Mod: CPTII,S$GLB,, | Performed by: INTERNAL MEDICINE

## 2021-09-17 PROCEDURE — 99396 PREV VISIT EST AGE 40-64: CPT | Mod: S$GLB,,, | Performed by: INTERNAL MEDICINE

## 2021-09-17 PROCEDURE — 85027 COMPLETE CBC AUTOMATED: CPT | Performed by: INTERNAL MEDICINE

## 2021-09-17 PROCEDURE — 80061 LIPID PANEL: CPT | Performed by: INTERNAL MEDICINE

## 2021-09-17 PROCEDURE — 80053 COMPREHEN METABOLIC PANEL: CPT | Performed by: INTERNAL MEDICINE

## 2021-09-17 PROCEDURE — 3078F PR MOST RECENT DIASTOLIC BLOOD PRESSURE < 80 MM HG: ICD-10-PCS | Mod: CPTII,S$GLB,, | Performed by: INTERNAL MEDICINE

## 2021-09-17 PROCEDURE — 3044F HG A1C LEVEL LT 7.0%: CPT | Mod: CPTII,S$GLB,, | Performed by: INTERNAL MEDICINE

## 2021-09-17 PROCEDURE — 3044F PR MOST RECENT HEMOGLOBIN A1C LEVEL <7.0%: ICD-10-PCS | Mod: CPTII,S$GLB,, | Performed by: INTERNAL MEDICINE

## 2021-09-17 PROCEDURE — 1159F MED LIST DOCD IN RCRD: CPT | Mod: CPTII,S$GLB,, | Performed by: INTERNAL MEDICINE

## 2021-09-17 PROCEDURE — 3008F BODY MASS INDEX DOCD: CPT | Mod: CPTII,S$GLB,, | Performed by: INTERNAL MEDICINE

## 2021-09-17 PROCEDURE — 83036 HEMOGLOBIN GLYCOSYLATED A1C: CPT | Performed by: INTERNAL MEDICINE

## 2021-09-17 PROCEDURE — 1160F RVW MEDS BY RX/DR IN RCRD: CPT | Mod: CPTII,S$GLB,, | Performed by: INTERNAL MEDICINE

## 2021-09-17 PROCEDURE — 84443 ASSAY THYROID STIM HORMONE: CPT | Performed by: INTERNAL MEDICINE

## 2021-09-17 PROCEDURE — 99396 PR PREVENTIVE VISIT,EST,40-64: ICD-10-PCS | Mod: S$GLB,,, | Performed by: INTERNAL MEDICINE

## 2021-09-17 PROCEDURE — 3078F DIAST BP <80 MM HG: CPT | Mod: CPTII,S$GLB,, | Performed by: INTERNAL MEDICINE

## 2021-09-17 PROCEDURE — 1160F PR REVIEW ALL MEDS BY PRESCRIBER/CLIN PHARMACIST DOCUMENTED: ICD-10-PCS | Mod: CPTII,S$GLB,, | Performed by: INTERNAL MEDICINE

## 2021-09-17 PROCEDURE — 3074F PR MOST RECENT SYSTOLIC BLOOD PRESSURE < 130 MM HG: ICD-10-PCS | Mod: CPTII,S$GLB,, | Performed by: INTERNAL MEDICINE

## 2021-09-17 PROCEDURE — 93010 ELECTROCARDIOGRAM REPORT: CPT | Mod: S$GLB,,, | Performed by: INTERNAL MEDICINE

## 2021-12-17 ENCOUNTER — IMMUNIZATION (OUTPATIENT)
Dept: PRIMARY CARE CLINIC | Facility: CLINIC | Age: 51
End: 2021-12-17
Payer: COMMERCIAL

## 2021-12-17 DIAGNOSIS — Z23 NEED FOR VACCINATION: Primary | ICD-10-CM

## 2021-12-17 PROCEDURE — 0064A COVID-19, MRNA, LNP-S, PF, 100 MCG/0.25 ML DOSE VACCINE (MODERNA BOOSTER): CPT | Mod: CV19,PBBFAC | Performed by: INTERNAL MEDICINE

## 2022-06-07 DIAGNOSIS — Z20.822 ENCOUNTER FOR LABORATORY TESTING FOR COVID-19 VIRUS: Primary | ICD-10-CM

## 2022-06-09 ENCOUNTER — LAB VISIT (OUTPATIENT)
Dept: LAB | Facility: HOSPITAL | Age: 52
End: 2022-06-09
Attending: INTERNAL MEDICINE
Payer: COMMERCIAL

## 2022-06-09 DIAGNOSIS — Z20.822 ENCOUNTER FOR LABORATORY TESTING FOR COVID-19 VIRUS: ICD-10-CM

## 2022-06-09 LAB
BASOPHILS # BLD AUTO: 0.04 K/UL (ref 0–0.2)
BASOPHILS NFR BLD: 0.7 % (ref 0–1.9)
DIFFERENTIAL METHOD: ABNORMAL
EOSINOPHIL # BLD AUTO: 0.3 K/UL (ref 0–0.5)
EOSINOPHIL NFR BLD: 4.5 % (ref 0–8)
ERYTHROCYTE [DISTWIDTH] IN BLOOD BY AUTOMATED COUNT: 12.5 % (ref 11.5–14.5)
HCT VFR BLD AUTO: 46.5 % (ref 40–54)
HGB BLD-MCNC: 15.6 G/DL (ref 14–18)
IMM GRANULOCYTES # BLD AUTO: 0.02 K/UL (ref 0–0.04)
IMM GRANULOCYTES NFR BLD AUTO: 0.3 % (ref 0–0.5)
LYMPHOCYTES # BLD AUTO: 1.4 K/UL (ref 1–4.8)
LYMPHOCYTES NFR BLD: 22.7 % (ref 18–48)
MCH RBC QN AUTO: 31.2 PG (ref 27–31)
MCHC RBC AUTO-ENTMCNC: 33.5 G/DL (ref 32–36)
MCV RBC AUTO: 93 FL (ref 82–98)
MONOCYTES # BLD AUTO: 0.6 K/UL (ref 0.3–1)
MONOCYTES NFR BLD: 10.1 % (ref 4–15)
NEUTROPHILS # BLD AUTO: 3.7 K/UL (ref 1.8–7.7)
NEUTROPHILS NFR BLD: 61.7 % (ref 38–73)
NRBC BLD-RTO: 0 /100 WBC
PLATELET # BLD AUTO: 241 K/UL (ref 150–450)
PMV BLD AUTO: 9.7 FL (ref 9.2–12.9)
RBC # BLD AUTO: 5 M/UL (ref 4.6–6.2)
WBC # BLD AUTO: 5.94 K/UL (ref 3.9–12.7)

## 2022-06-09 PROCEDURE — 85379 FIBRIN DEGRADATION QUANT: CPT | Performed by: INTERNAL MEDICINE

## 2022-06-09 PROCEDURE — 85025 COMPLETE CBC W/AUTO DIFF WBC: CPT | Performed by: INTERNAL MEDICINE

## 2022-06-09 PROCEDURE — 36415 COLL VENOUS BLD VENIPUNCTURE: CPT | Mod: PO | Performed by: INTERNAL MEDICINE

## 2022-06-10 ENCOUNTER — HOSPITAL ENCOUNTER (OUTPATIENT)
Dept: RADIOLOGY | Facility: HOSPITAL | Age: 52
Discharge: HOME | End: 2022-06-10
Attending: INTERNAL MEDICINE
Payer: COMMERCIAL

## 2022-06-10 DIAGNOSIS — Z20.822 ENCOUNTER FOR LABORATORY TESTING FOR COVID-19 VIRUS: ICD-10-CM

## 2022-06-10 LAB — D DIMER PPP IA.FEU-MCNC: 0.24 MG/L FEU

## 2022-06-10 PROCEDURE — 71046 X-RAY EXAM CHEST 2 VIEWS: CPT | Mod: 26,,, | Performed by: RADIOLOGY

## 2022-06-10 PROCEDURE — 71046 XR CHEST PA AND LATERAL: ICD-10-PCS | Mod: 26,,, | Performed by: RADIOLOGY

## 2022-06-10 PROCEDURE — 71046 X-RAY EXAM CHEST 2 VIEWS: CPT | Mod: TC,PO

## 2022-06-21 DIAGNOSIS — Z00.00 ROUTINE GENERAL MEDICAL EXAMINATION AT A HEALTH CARE FACILITY: Primary | ICD-10-CM

## 2022-08-26 ENCOUNTER — CLINICAL SUPPORT (OUTPATIENT)
Dept: INTERNAL MEDICINE | Facility: CLINIC | Age: 52
End: 2022-08-26
Payer: COMMERCIAL

## 2022-08-26 ENCOUNTER — HOSPITAL ENCOUNTER (OUTPATIENT)
Dept: CARDIOLOGY | Facility: CLINIC | Age: 52
Discharge: HOME | End: 2022-08-26
Payer: COMMERCIAL

## 2022-08-26 ENCOUNTER — OFFICE VISIT (OUTPATIENT)
Dept: INTERNAL MEDICINE | Facility: CLINIC | Age: 52
End: 2022-08-26
Payer: COMMERCIAL

## 2022-08-26 DIAGNOSIS — Z00.00 ROUTINE GENERAL MEDICAL EXAMINATION AT A HEALTH CARE FACILITY: Primary | ICD-10-CM

## 2022-08-26 DIAGNOSIS — Z00.00 ANNUAL PHYSICAL EXAM: Primary | ICD-10-CM

## 2022-08-26 DIAGNOSIS — Z00.00 ROUTINE GENERAL MEDICAL EXAMINATION AT A HEALTH CARE FACILITY: ICD-10-CM

## 2022-08-26 LAB
ALBUMIN SERPL BCP-MCNC: 4.1 G/DL (ref 3.5–5.2)
ALP SERPL-CCNC: 90 U/L (ref 55–135)
ALT SERPL W/O P-5'-P-CCNC: 35 U/L (ref 10–44)
ANION GAP SERPL CALC-SCNC: 5 MMOL/L (ref 8–16)
AST SERPL-CCNC: 25 U/L (ref 10–40)
BILIRUB SERPL-MCNC: 1 MG/DL (ref 0.1–1)
BUN SERPL-MCNC: 13 MG/DL (ref 6–20)
CALCIUM SERPL-MCNC: 9.4 MG/DL (ref 8.7–10.5)
CHLORIDE SERPL-SCNC: 105 MMOL/L (ref 95–110)
CHOLEST SERPL-MCNC: 177 MG/DL (ref 120–199)
CHOLEST/HDLC SERPL: 3.7 {RATIO} (ref 2–5)
CO2 SERPL-SCNC: 28 MMOL/L (ref 23–29)
COMPLEXED PSA SERPL-MCNC: 0.98 NG/ML (ref 0–4)
CREAT SERPL-MCNC: 1 MG/DL (ref 0.5–1.4)
ERYTHROCYTE [DISTWIDTH] IN BLOOD BY AUTOMATED COUNT: 12.7 % (ref 11.5–14.5)
EST. GFR  (NO RACE VARIABLE): >60 ML/MIN/1.73 M^2
ESTIMATED AVG GLUCOSE: 108 MG/DL (ref 68–131)
GLUCOSE SERPL-MCNC: 96 MG/DL (ref 70–110)
HBA1C MFR BLD: 5.4 % (ref 4–5.6)
HCT VFR BLD AUTO: 47 % (ref 40–54)
HDLC SERPL-MCNC: 48 MG/DL (ref 40–75)
HDLC SERPL: 27.1 % (ref 20–50)
HGB BLD-MCNC: 16.5 G/DL (ref 14–18)
LDLC SERPL CALC-MCNC: 107.4 MG/DL (ref 63–159)
MCH RBC QN AUTO: 31.4 PG (ref 27–31)
MCHC RBC AUTO-ENTMCNC: 35.1 G/DL (ref 32–36)
MCV RBC AUTO: 90 FL (ref 82–98)
NONHDLC SERPL-MCNC: 129 MG/DL
PLATELET # BLD AUTO: 236 K/UL (ref 150–450)
PMV BLD AUTO: 9.8 FL (ref 9.2–12.9)
POTASSIUM SERPL-SCNC: 4.3 MMOL/L (ref 3.5–5.1)
PROT SERPL-MCNC: 7.2 G/DL (ref 6–8.4)
RBC # BLD AUTO: 5.25 M/UL (ref 4.6–6.2)
SODIUM SERPL-SCNC: 138 MMOL/L (ref 136–145)
TRIGL SERPL-MCNC: 108 MG/DL (ref 30–150)
TSH SERPL DL<=0.005 MIU/L-ACNC: 2.04 UIU/ML (ref 0.4–4)
WBC # BLD AUTO: 5.95 K/UL (ref 3.9–12.7)

## 2022-08-26 PROCEDURE — 84153 ASSAY OF PSA TOTAL: CPT | Performed by: INTERNAL MEDICINE

## 2022-08-26 PROCEDURE — 3078F PR MOST RECENT DIASTOLIC BLOOD PRESSURE < 80 MM HG: ICD-10-PCS | Mod: CPTII,S$GLB,, | Performed by: INTERNAL MEDICINE

## 2022-08-26 PROCEDURE — 3044F PR MOST RECENT HEMOGLOBIN A1C LEVEL <7.0%: ICD-10-PCS | Mod: CPTII,S$GLB,, | Performed by: INTERNAL MEDICINE

## 2022-08-26 PROCEDURE — 99999 PR PBB SHADOW E&M-EST. PATIENT-LVL I: ICD-10-PCS | Mod: PBBFAC,,,

## 2022-08-26 PROCEDURE — 99386 PR PREVENTIVE VISIT,NEW,40-64: ICD-10-PCS | Mod: S$GLB,,, | Performed by: INTERNAL MEDICINE

## 2022-08-26 PROCEDURE — 1160F RVW MEDS BY RX/DR IN RCRD: CPT | Mod: CPTII,S$GLB,, | Performed by: INTERNAL MEDICINE

## 2022-08-26 PROCEDURE — 3074F SYST BP LT 130 MM HG: CPT | Mod: CPTII,S$GLB,, | Performed by: INTERNAL MEDICINE

## 2022-08-26 PROCEDURE — 93005 EKG 12-LEAD: ICD-10-PCS | Mod: S$GLB,,, | Performed by: INTERNAL MEDICINE

## 2022-08-26 PROCEDURE — 93010 EKG 12-LEAD: ICD-10-PCS | Mod: S$GLB,,, | Performed by: INTERNAL MEDICINE

## 2022-08-26 PROCEDURE — 99386 PREV VISIT NEW AGE 40-64: CPT | Mod: S$GLB,,, | Performed by: INTERNAL MEDICINE

## 2022-08-26 PROCEDURE — 1159F MED LIST DOCD IN RCRD: CPT | Mod: CPTII,S$GLB,, | Performed by: INTERNAL MEDICINE

## 2022-08-26 PROCEDURE — 1160F PR REVIEW ALL MEDS BY PRESCRIBER/CLIN PHARMACIST DOCUMENTED: ICD-10-PCS | Mod: CPTII,S$GLB,, | Performed by: INTERNAL MEDICINE

## 2022-08-26 PROCEDURE — 1159F PR MEDICATION LIST DOCUMENTED IN MEDICAL RECORD: ICD-10-PCS | Mod: CPTII,S$GLB,, | Performed by: INTERNAL MEDICINE

## 2022-08-26 PROCEDURE — 3078F DIAST BP <80 MM HG: CPT | Mod: CPTII,S$GLB,, | Performed by: INTERNAL MEDICINE

## 2022-08-26 PROCEDURE — 80061 LIPID PANEL: CPT | Performed by: INTERNAL MEDICINE

## 2022-08-26 PROCEDURE — 86803 HEPATITIS C AB TEST: CPT | Performed by: INTERNAL MEDICINE

## 2022-08-26 PROCEDURE — 93010 ELECTROCARDIOGRAM REPORT: CPT | Mod: S$GLB,,, | Performed by: INTERNAL MEDICINE

## 2022-08-26 PROCEDURE — 3044F HG A1C LEVEL LT 7.0%: CPT | Mod: CPTII,S$GLB,, | Performed by: INTERNAL MEDICINE

## 2022-08-26 PROCEDURE — 83036 HEMOGLOBIN GLYCOSYLATED A1C: CPT | Performed by: INTERNAL MEDICINE

## 2022-08-26 PROCEDURE — 84443 ASSAY THYROID STIM HORMONE: CPT | Performed by: INTERNAL MEDICINE

## 2022-08-26 PROCEDURE — 93005 ELECTROCARDIOGRAM TRACING: CPT | Mod: S$GLB,,, | Performed by: INTERNAL MEDICINE

## 2022-08-26 PROCEDURE — 99999 PR PBB SHADOW E&M-EST. PATIENT-LVL III: CPT | Mod: PBBFAC,,, | Performed by: INTERNAL MEDICINE

## 2022-08-26 PROCEDURE — 85027 COMPLETE CBC AUTOMATED: CPT | Performed by: INTERNAL MEDICINE

## 2022-08-26 PROCEDURE — 3008F PR BODY MASS INDEX (BMI) DOCUMENTED: ICD-10-PCS | Mod: CPTII,S$GLB,, | Performed by: INTERNAL MEDICINE

## 2022-08-26 PROCEDURE — 3008F BODY MASS INDEX DOCD: CPT | Mod: CPTII,S$GLB,, | Performed by: INTERNAL MEDICINE

## 2022-08-26 PROCEDURE — 99999 PR PBB SHADOW E&M-EST. PATIENT-LVL III: ICD-10-PCS | Mod: PBBFAC,,, | Performed by: INTERNAL MEDICINE

## 2022-08-26 PROCEDURE — 3074F PR MOST RECENT SYSTOLIC BLOOD PRESSURE < 130 MM HG: ICD-10-PCS | Mod: CPTII,S$GLB,, | Performed by: INTERNAL MEDICINE

## 2022-08-26 PROCEDURE — 99999 PR PBB SHADOW E&M-EST. PATIENT-LVL I: CPT | Mod: PBBFAC,,,

## 2022-08-26 PROCEDURE — 80053 COMPREHEN METABOLIC PANEL: CPT | Performed by: INTERNAL MEDICINE

## 2022-08-29 LAB — HCV AB SERPL QL IA: NEGATIVE

## 2022-08-31 VITALS
WEIGHT: 182.81 LBS | SYSTOLIC BLOOD PRESSURE: 112 MMHG | HEART RATE: 66 BPM | DIASTOLIC BLOOD PRESSURE: 78 MMHG | BODY MASS INDEX: 27.79 KG/M2

## 2022-08-31 NOTE — PROGRESS NOTES
Subjective:       Patient ID: Enrique Jimenez is a 51 y.o. male.    Chief Complaint: Executive Health    HPIMr Jimenez is here for his Executive Health exam. Overall doing well and had no complaints. He has been active physically during this time and feels well. We briefly discussed his having undergone a colonoscopy in 2020 at another facility. The results were normal and he will make available to us the report for his records here at [facility]. We also discussed the importance of yearly prostate checks given his significant family history of prostate cancer in several of his family members including his father and 2 brothers. His PSA from today labs was 0.98 which is basically unchanged from previous results. He has not urinary symptoms, voiding well and will repeat PSA in 1 year for comparison. The remainder of his studies including chest xray and EKG were unremarkable as compared to his previous exams.  Review of Systems   All other systems reviewed and are negative.      Objective:      Physical Exam  Vitals and nursing note reviewed.   Constitutional:       General: He is not in acute distress.     Appearance: Normal appearance. He is normal weight.   HENT:      Head: Normocephalic and atraumatic.      Right Ear: Tympanic membrane, ear canal and external ear normal. There is no impacted cerumen.      Left Ear: Tympanic membrane, ear canal and external ear normal. There is no impacted cerumen.      Nose: Nose normal.      Mouth/Throat:      Mouth: Mucous membranes are moist.      Pharynx: Oropharynx is clear.   Eyes:      General: No scleral icterus.        Right eye: No discharge.         Left eye: No discharge.      Extraocular Movements: Extraocular movements intact.      Conjunctiva/sclera: Conjunctivae normal.      Pupils: Pupils are equal, round, and reactive to light.   Neck:      Vascular: No carotid bruit.   Cardiovascular:      Rate and Rhythm: Normal rate and regular rhythm.      Pulses: Normal  pulses.      Heart sounds: Normal heart sounds. No murmur heard.  Pulmonary:      Effort: Pulmonary effort is normal. No respiratory distress.      Breath sounds: Normal breath sounds. No wheezing.   Chest:      Chest wall: No tenderness.   Abdominal:      General: Abdomen is flat. Bowel sounds are normal. There is no distension.      Palpations: Abdomen is soft. There is no mass.      Tenderness: There is no abdominal tenderness.   Musculoskeletal:         General: No tenderness. Normal range of motion.      Cervical back: Normal range of motion and neck supple. No rigidity or tenderness.      Right lower leg: No edema.      Left lower leg: No edema.   Lymphadenopathy:      Cervical: No cervical adenopathy.   Skin:     General: Skin is warm and dry.      Findings: No erythema, lesion or rash.   Neurological:      General: No focal deficit present.      Mental Status: He is alert and oriented to person, place, and time.      Cranial Nerves: No cranial nerve deficit.      Motor: No weakness.      Coordination: Coordination normal.   Psychiatric:         Mood and Affect: Mood normal.         Behavior: Behavior normal.         Thought Content: Thought content normal.         Judgment: Judgment normal.       Assessment:       Problem List Items Addressed This Visit    None  Visit Diagnoses       Routine general medical examination at a health care facility    -  Primary              Plan:    1.Return to Clinic in 1 year for annual exam and as needed.

## 2023-03-08 DIAGNOSIS — Z00.00 ROUTINE GENERAL MEDICAL EXAMINATION AT A HEALTH CARE FACILITY: Primary | ICD-10-CM

## 2023-06-23 ENCOUNTER — HOSPITAL ENCOUNTER (OUTPATIENT)
Dept: CARDIOLOGY | Facility: CLINIC | Age: 53
Discharge: HOME | End: 2023-06-23
Payer: COMMERCIAL

## 2023-06-23 ENCOUNTER — CLINICAL SUPPORT (OUTPATIENT)
Dept: INTERNAL MEDICINE | Facility: CLINIC | Age: 53
End: 2023-06-23
Payer: COMMERCIAL

## 2023-06-23 ENCOUNTER — OFFICE VISIT (OUTPATIENT)
Dept: INTERNAL MEDICINE | Facility: CLINIC | Age: 53
End: 2023-06-23
Payer: COMMERCIAL

## 2023-06-23 VITALS
BODY MASS INDEX: 28.7 KG/M2 | HEART RATE: 65 BPM | SYSTOLIC BLOOD PRESSURE: 137 MMHG | WEIGHT: 189.38 LBS | DIASTOLIC BLOOD PRESSURE: 71 MMHG | HEIGHT: 68 IN

## 2023-06-23 DIAGNOSIS — Z00.00 ROUTINE GENERAL MEDICAL EXAMINATION AT A HEALTH CARE FACILITY: Primary | ICD-10-CM

## 2023-06-23 DIAGNOSIS — Z00.00 ROUTINE GENERAL MEDICAL EXAMINATION AT A HEALTH CARE FACILITY: ICD-10-CM

## 2023-06-23 DIAGNOSIS — Z00.00 HEALTHCARE MAINTENANCE: ICD-10-CM

## 2023-06-23 DIAGNOSIS — M67.432 GANGLION CYST OF WRIST, LEFT: ICD-10-CM

## 2023-06-23 LAB
ALBUMIN SERPL BCP-MCNC: 4.1 G/DL (ref 3.5–5.2)
ALP SERPL-CCNC: 85 U/L (ref 55–135)
ALT SERPL W/O P-5'-P-CCNC: 36 U/L (ref 10–44)
ANION GAP SERPL CALC-SCNC: 9 MMOL/L (ref 8–16)
AST SERPL-CCNC: 25 U/L (ref 10–40)
BILIRUB SERPL-MCNC: 1.2 MG/DL (ref 0.1–1)
BUN SERPL-MCNC: 14 MG/DL (ref 6–20)
CALCIUM SERPL-MCNC: 9.6 MG/DL (ref 8.7–10.5)
CHLORIDE SERPL-SCNC: 108 MMOL/L (ref 95–110)
CHOLEST SERPL-MCNC: 208 MG/DL (ref 120–199)
CHOLEST/HDLC SERPL: 3.9 {RATIO} (ref 2–5)
CO2 SERPL-SCNC: 25 MMOL/L (ref 23–29)
COMPLEXED PSA SERPL-MCNC: 1.1 NG/ML (ref 0–4)
CREAT SERPL-MCNC: 1.1 MG/DL (ref 0.5–1.4)
ERYTHROCYTE [DISTWIDTH] IN BLOOD BY AUTOMATED COUNT: 12.5 % (ref 11.5–14.5)
EST. GFR  (NO RACE VARIABLE): >60 ML/MIN/1.73 M^2
ESTIMATED AVG GLUCOSE: 103 MG/DL (ref 68–131)
GLUCOSE SERPL-MCNC: 91 MG/DL (ref 70–110)
HBA1C MFR BLD: 5.2 % (ref 4–5.6)
HCT VFR BLD AUTO: 46.8 % (ref 40–54)
HDLC SERPL-MCNC: 54 MG/DL (ref 40–75)
HDLC SERPL: 26 % (ref 20–50)
HGB BLD-MCNC: 16 G/DL (ref 14–18)
LDLC SERPL CALC-MCNC: 135 MG/DL (ref 63–159)
MCH RBC QN AUTO: 30.8 PG (ref 27–31)
MCHC RBC AUTO-ENTMCNC: 34.2 G/DL (ref 32–36)
MCV RBC AUTO: 90 FL (ref 82–98)
NONHDLC SERPL-MCNC: 154 MG/DL
PLATELET # BLD AUTO: 217 K/UL (ref 150–450)
PMV BLD AUTO: 9.5 FL (ref 9.2–12.9)
POTASSIUM SERPL-SCNC: 4.8 MMOL/L (ref 3.5–5.1)
PROT SERPL-MCNC: 7.3 G/DL (ref 6–8.4)
RBC # BLD AUTO: 5.19 M/UL (ref 4.6–6.2)
SODIUM SERPL-SCNC: 142 MMOL/L (ref 136–145)
TRIGL SERPL-MCNC: 95 MG/DL (ref 30–150)
TSH SERPL DL<=0.005 MIU/L-ACNC: 2.31 UIU/ML (ref 0.4–4)
WBC # BLD AUTO: 5.65 K/UL (ref 3.9–12.7)

## 2023-06-23 PROCEDURE — 3044F HG A1C LEVEL LT 7.0%: CPT | Mod: CPTII,S$GLB,, | Performed by: STUDENT IN AN ORGANIZED HEALTH CARE EDUCATION/TRAINING PROGRAM

## 2023-06-23 PROCEDURE — 80053 COMPREHEN METABOLIC PANEL: CPT | Performed by: STUDENT IN AN ORGANIZED HEALTH CARE EDUCATION/TRAINING PROGRAM

## 2023-06-23 PROCEDURE — 1159F MED LIST DOCD IN RCRD: CPT | Mod: CPTII,S$GLB,, | Performed by: STUDENT IN AN ORGANIZED HEALTH CARE EDUCATION/TRAINING PROGRAM

## 2023-06-23 PROCEDURE — 3008F PR BODY MASS INDEX (BMI) DOCUMENTED: ICD-10-PCS | Mod: CPTII,S$GLB,, | Performed by: STUDENT IN AN ORGANIZED HEALTH CARE EDUCATION/TRAINING PROGRAM

## 2023-06-23 PROCEDURE — 97750 PHYSICAL PERFORMANCE TEST: CPT | Mod: S$GLB,,, | Performed by: INTERNAL MEDICINE

## 2023-06-23 PROCEDURE — 3078F PR MOST RECENT DIASTOLIC BLOOD PRESSURE < 80 MM HG: ICD-10-PCS | Mod: CPTII,S$GLB,, | Performed by: STUDENT IN AN ORGANIZED HEALTH CARE EDUCATION/TRAINING PROGRAM

## 2023-06-23 PROCEDURE — 85027 COMPLETE CBC AUTOMATED: CPT | Performed by: STUDENT IN AN ORGANIZED HEALTH CARE EDUCATION/TRAINING PROGRAM

## 2023-06-23 PROCEDURE — 99999 PR PBB SHADOW E&M-EST. PATIENT-LVL III: CPT | Mod: PBBFAC,,, | Performed by: STUDENT IN AN ORGANIZED HEALTH CARE EDUCATION/TRAINING PROGRAM

## 2023-06-23 PROCEDURE — 1159F PR MEDICATION LIST DOCUMENTED IN MEDICAL RECORD: ICD-10-PCS | Mod: CPTII,S$GLB,, | Performed by: STUDENT IN AN ORGANIZED HEALTH CARE EDUCATION/TRAINING PROGRAM

## 2023-06-23 PROCEDURE — 99396 PREV VISIT EST AGE 40-64: CPT | Mod: S$GLB,,, | Performed by: STUDENT IN AN ORGANIZED HEALTH CARE EDUCATION/TRAINING PROGRAM

## 2023-06-23 PROCEDURE — 3008F BODY MASS INDEX DOCD: CPT | Mod: CPTII,S$GLB,, | Performed by: STUDENT IN AN ORGANIZED HEALTH CARE EDUCATION/TRAINING PROGRAM

## 2023-06-23 PROCEDURE — 99999 PR PBB SHADOW E&M-EST. PATIENT-LVL I: CPT | Mod: PBBFAC,,,

## 2023-06-23 PROCEDURE — 1160F RVW MEDS BY RX/DR IN RCRD: CPT | Mod: CPTII,S$GLB,, | Performed by: STUDENT IN AN ORGANIZED HEALTH CARE EDUCATION/TRAINING PROGRAM

## 2023-06-23 PROCEDURE — 93010 EKG 12-LEAD: ICD-10-PCS | Mod: S$GLB,,, | Performed by: INTERNAL MEDICINE

## 2023-06-23 PROCEDURE — 99999 PR PBB SHADOW E&M-EST. PATIENT-LVL III: ICD-10-PCS | Mod: PBBFAC,,, | Performed by: STUDENT IN AN ORGANIZED HEALTH CARE EDUCATION/TRAINING PROGRAM

## 2023-06-23 PROCEDURE — 3078F DIAST BP <80 MM HG: CPT | Mod: CPTII,S$GLB,, | Performed by: STUDENT IN AN ORGANIZED HEALTH CARE EDUCATION/TRAINING PROGRAM

## 2023-06-23 PROCEDURE — 97750 PR PHYSICAL PERFORMANCE TEST: ICD-10-PCS | Mod: S$GLB,,, | Performed by: INTERNAL MEDICINE

## 2023-06-23 PROCEDURE — 3075F PR MOST RECENT SYSTOLIC BLOOD PRESS GE 130-139MM HG: ICD-10-PCS | Mod: CPTII,S$GLB,, | Performed by: STUDENT IN AN ORGANIZED HEALTH CARE EDUCATION/TRAINING PROGRAM

## 2023-06-23 PROCEDURE — 93005 EKG 12-LEAD: ICD-10-PCS | Mod: S$GLB,,, | Performed by: STUDENT IN AN ORGANIZED HEALTH CARE EDUCATION/TRAINING PROGRAM

## 2023-06-23 PROCEDURE — 93005 ELECTROCARDIOGRAM TRACING: CPT | Mod: S$GLB,,, | Performed by: STUDENT IN AN ORGANIZED HEALTH CARE EDUCATION/TRAINING PROGRAM

## 2023-06-23 PROCEDURE — 93010 ELECTROCARDIOGRAM REPORT: CPT | Mod: S$GLB,,, | Performed by: INTERNAL MEDICINE

## 2023-06-23 PROCEDURE — 1160F PR REVIEW ALL MEDS BY PRESCRIBER/CLIN PHARMACIST DOCUMENTED: ICD-10-PCS | Mod: CPTII,S$GLB,, | Performed by: STUDENT IN AN ORGANIZED HEALTH CARE EDUCATION/TRAINING PROGRAM

## 2023-06-23 PROCEDURE — 99999 PR PBB SHADOW E&M-EST. PATIENT-LVL I: ICD-10-PCS | Mod: PBBFAC,,,

## 2023-06-23 PROCEDURE — 3075F SYST BP GE 130 - 139MM HG: CPT | Mod: CPTII,S$GLB,, | Performed by: STUDENT IN AN ORGANIZED HEALTH CARE EDUCATION/TRAINING PROGRAM

## 2023-06-23 PROCEDURE — 80061 LIPID PANEL: CPT | Performed by: STUDENT IN AN ORGANIZED HEALTH CARE EDUCATION/TRAINING PROGRAM

## 2023-06-23 PROCEDURE — 84153 ASSAY OF PSA TOTAL: CPT | Performed by: STUDENT IN AN ORGANIZED HEALTH CARE EDUCATION/TRAINING PROGRAM

## 2023-06-23 PROCEDURE — 83036 HEMOGLOBIN GLYCOSYLATED A1C: CPT | Performed by: STUDENT IN AN ORGANIZED HEALTH CARE EDUCATION/TRAINING PROGRAM

## 2023-06-23 PROCEDURE — 3044F PR MOST RECENT HEMOGLOBIN A1C LEVEL <7.0%: ICD-10-PCS | Mod: CPTII,S$GLB,, | Performed by: STUDENT IN AN ORGANIZED HEALTH CARE EDUCATION/TRAINING PROGRAM

## 2023-06-23 PROCEDURE — 84443 ASSAY THYROID STIM HORMONE: CPT | Performed by: STUDENT IN AN ORGANIZED HEALTH CARE EDUCATION/TRAINING PROGRAM

## 2023-06-23 PROCEDURE — 99396 PR PREVENTIVE VISIT,EST,40-64: ICD-10-PCS | Mod: S$GLB,,, | Performed by: STUDENT IN AN ORGANIZED HEALTH CARE EDUCATION/TRAINING PROGRAM

## 2023-06-23 NOTE — PATIENT INSTRUCTIONS
Recommendations for patient:  - Continue physical activity  - Get vaccinated against shingles  - Try to avoid food rich in cholesterol

## 2023-06-23 NOTE — PROGRESS NOTES
Pt. Has no significant cardiovascular or pulmonary history.    Physical Limitations:  Patient denied any limitations to physical activity.      Current exercise routine:  Patient currently performs vigorous intensity aerobic exercise for 60 minutes, 2 days a week.  Patient practices full body resistance training and stretches, 3-4 days a week.    Goals:  Patient set a goal weight of 168 lbs.    Notes:  Patient was very friendly and engaged.  Patient noted that he was his heaviest weight in December and has lost about 10 lbs since starting a regular exercise routine in January and increasing to his current routine in March.  Patient was pleased with his outcomes and receptive to the recommendation to keep up with his current routine.      The fitness evaluation results are as follows:    D.O.S. 6/23/2023 5/10/2019 5/10/2018 4/13/2017 3/18/2016   Height (in): 67.5 67.5 67.5 67.5 68   Weight (lbs): 172.2 175 176 175 174   BMI: 26.972481 27.163461 27.800508 27.37966 26.66844   Body Fat (%): 22.70 22.11 26.59 25.41 27.50   Waist (cm): 89 94 93 92 92   RBP (mmHg): 124/66 118/78 110/70 126/80 118/76   RHR (bpm): 56 70 46 52 48    Strength Dominant (Lbs): 121 118.94224 126 127 93.55877    Strength Non Dominant (Lbs): 120 116.21759 120 122 100   Push-up Assessment: 40 40 Deferred 40 37   Curl-up Assessment: 75 44 39 33 20   Flexibility Testing (cm): 27 23 35 29 30   REE (kcals): 1675 1470 1400 1450 1460     Age/gender stratified assessment:  Resting BP: Within Normal Limits   Body Fat %: Very Good   Waist Circumfernece: Low Risk    Strength Dominant: 75th    Strength Non Dominant: 75th   Upper Body Endurance: Excellent   Abdominal Endurance: Well Above Average   Lower body Flexibiltiy: Good       Recommended fitness guidelines:    -150 minutes of moderate intensity aerobic exercise per week or 75 minutes of vigorous intensity aerobic exercise per week.    -2 to 4 days per week of resistance training for each  muscle group.      -Daily stretching with a hold of at least 30 seconds per muscle group.

## 2023-06-23 NOTE — PROGRESS NOTES
Subjective:       Patient ID: Enrique Jimenez is a 52 y.o. male.    Chief Complaint: [employer] (Left lujan Lipoma? )    HPI    Enrique Jimenez is a 52 y.o. male , English speaking, with a history of:  HLD  H/o lypoma  Left wrist ganglion      Patient comes to the clinic a general medical health examination and to establish care  He is a established patient in this clinic, but this is the first time I am seeing him    Patient is currently asymptomatic and has no complaints.  He noticed a small mas at the base of his left thumb, mobile, it doesn't hurt and it doesn't bother him.  Patient denies CV symptoms, CP, SOB, palpitations.  Patient denies constitutional symptoms, fever, changes in the urine or stool.    Today's labs:  CBC WNL  CMP WNL  Lipid panel: , HDL 54, , TG 95  A1c: 5.2  TSH WNL 2.307  PSA 1.1  ECG NSR    PMH:  Past Medical History:   Diagnosis Date    Family history of prostate cancer 4/13/2017    Father and brother.    Mixed hyperlipidemia 11/25/2014       PSH:  Past Surgical History:   Procedure Laterality Date    LIPOMA RESECTION         FH:  Family History   Problem Relation Age of Onset    Cancer Father 77        prostate cancer    Heart disease Father     Cancer Brother 58        Prostate cancer.    Heart disease Mother        Allergies: Negative  Review of patient's allergies indicates:  No Known Allergies    Meds:  No current outpatient medications on file.    Social:  , works for [employer]  Has 3 grown children    Exercise: Physically active  Diet: Tries to eat healthy, has been eating a lot of protein  Tobacco: Denies  Alcohol: Denies  Recreational drug use: Denies  Recent travel: Denies    Healthcare Maintenance:  Colonoscopy: 2021 (next in 2025)  Vaccinations: Pneumonia N/A, Zoster (no), Tetanus 2015  COVID vaccination: completed x 3  Depression screening: PHQ2 score = 0    Annual visit approx. Month: June ROS  11-point review of systems done. Negative except for  "detailed in the HPI.        Objective:      Vitals:    06/23/23 0906   BP: 137/71   Pulse: 65   Weight: 85.9 kg (189 lb 6.4 oz)   Height: 5' 8" (1.727 m)         Physical Exam  Vitals and nursing note reviewed.   Constitutional:       Appearance: Normal appearance.   HENT:      Head: Normocephalic and atraumatic.      Right Ear: Tympanic membrane normal.      Left Ear: Tympanic membrane normal.      Nose: Nose normal.      Mouth/Throat:      Mouth: Mucous membranes are moist.      Pharynx: Oropharynx is clear.   Eyes:      Extraocular Movements: Extraocular movements intact.      Conjunctiva/sclera: Conjunctivae normal.      Pupils: Pupils are equal, round, and reactive to light.   Cardiovascular:      Rate and Rhythm: Normal rate and regular rhythm.      Pulses: Normal pulses.      Heart sounds: Normal heart sounds.   Pulmonary:      Effort: Pulmonary effort is normal.      Breath sounds: Normal breath sounds.   Abdominal:      General: Bowel sounds are normal. There is no distension.      Palpations: Abdomen is soft.      Tenderness: There is no abdominal tenderness.   Musculoskeletal:         General: Normal range of motion.      Cervical back: Normal range of motion and neck supple.      Comments: 1 cm cyst noted at the base of his left thumb, mobile, non-tender   Skin:     General: Skin is warm.   Neurological:      General: No focal deficit present.      Mental Status: He is alert and oriented to person, place, and time. Mental status is at baseline.   Psychiatric:         Mood and Affect: Mood normal.          Assessment:       1. Routine general medical examination at a health care facility  Assessment & Plan:  Patient is in overall good general health.  Stable medical conditions listed on the PMH.  Labs reviewed and patient notified.        2. Ganglion cyst of wrist, left  Assessment & Plan:  Base of the right thumb.  Asymptomatic  Will follow for now      3. Healthcare maintenance  Assessment & " Plan:  Health care maintenance and core gaps reviewed and assessed with patient.  Vaccinations recommended:        Tetanus - 2015       Shingles - N/A       Influenza - 2022       Pneumonia - N/A  Colon cancer screening:   Colonoscopy: 2021  Lifestyle recommendations given.  Physical activity recommended.    Legend: Ordered (O), Declined (D), Current (C)      Orders:  -     Zoster Recombinant Vaccine; Standing       Plan:       Continue physical activity  Shingrix vaccine ordered    Recommendations for patient:  - Continue physical activity  - Get vaccinated against shingles  - Try to avoid food rich in cholesterol      Education provided  Lifestyle recommendations given  AVS generated, explained, and sent to the patient through the patient portal.  RTC in : 1 year for annual wellness visit, labs not ordered yet          THERESA DOUGLAS MD, MPH  Internal Medicine  [facility]  [facility]

## 2023-06-23 NOTE — ASSESSMENT & PLAN NOTE
Health care maintenance and core gaps reviewed and assessed with patient.  Vaccinations recommended:        Tetanus - 2015       Shingles - N/A       Influenza - 2022       Pneumonia - N/A  Colon cancer screening:   Colonoscopy: 2021  Lifestyle recommendations given.  Physical activity recommended.    Legend: Ordered (O), Declined (D), Current (C)

## 2023-09-14 ENCOUNTER — OFFICE VISIT (OUTPATIENT)
Dept: SPORTS MEDICINE | Facility: CLINIC | Age: 53
End: 2023-09-14
Payer: COMMERCIAL

## 2023-09-14 ENCOUNTER — HOSPITAL ENCOUNTER (OUTPATIENT)
Dept: RADIOLOGY | Facility: HOSPITAL | Age: 53
Discharge: HOME | End: 2023-09-14
Attending: ORTHOPAEDIC SURGERY
Payer: COMMERCIAL

## 2023-09-14 VITALS
DIASTOLIC BLOOD PRESSURE: 68 MMHG | SYSTOLIC BLOOD PRESSURE: 127 MMHG | BODY MASS INDEX: 26.06 KG/M2 | HEART RATE: 67 BPM | WEIGHT: 171.94 LBS | HEIGHT: 68 IN

## 2023-09-14 DIAGNOSIS — M25.511 RIGHT SHOULDER PAIN, UNSPECIFIED CHRONICITY: Primary | ICD-10-CM

## 2023-09-14 DIAGNOSIS — M25.511 ACUTE PAIN OF RIGHT SHOULDER: ICD-10-CM

## 2023-09-14 DIAGNOSIS — M25.511 RIGHT SHOULDER PAIN, UNSPECIFIED CHRONICITY: ICD-10-CM

## 2023-09-14 PROCEDURE — 3074F SYST BP LT 130 MM HG: CPT | Mod: CPTII,S$GLB,, | Performed by: ORTHOPAEDIC SURGERY

## 2023-09-14 PROCEDURE — 1159F MED LIST DOCD IN RCRD: CPT | Mod: CPTII,S$GLB,, | Performed by: ORTHOPAEDIC SURGERY

## 2023-09-14 PROCEDURE — 3078F DIAST BP <80 MM HG: CPT | Mod: CPTII,S$GLB,, | Performed by: ORTHOPAEDIC SURGERY

## 2023-09-14 PROCEDURE — 3008F PR BODY MASS INDEX (BMI) DOCUMENTED: ICD-10-PCS | Mod: CPTII,S$GLB,, | Performed by: ORTHOPAEDIC SURGERY

## 2023-09-14 PROCEDURE — 1159F PR MEDICATION LIST DOCUMENTED IN MEDICAL RECORD: ICD-10-PCS | Mod: CPTII,S$GLB,, | Performed by: ORTHOPAEDIC SURGERY

## 2023-09-14 PROCEDURE — 99999 PR PBB SHADOW E&M-EST. PATIENT-LVL III: ICD-10-PCS | Mod: PBBFAC,,, | Performed by: ORTHOPAEDIC SURGERY

## 2023-09-14 PROCEDURE — 73030 XR SHOULDER COMPLETE 2 OR MORE VIEWS RIGHT: ICD-10-PCS | Mod: 26,RT,, | Performed by: RADIOLOGY

## 2023-09-14 PROCEDURE — 3044F PR MOST RECENT HEMOGLOBIN A1C LEVEL <7.0%: ICD-10-PCS | Mod: CPTII,S$GLB,, | Performed by: ORTHOPAEDIC SURGERY

## 2023-09-14 PROCEDURE — 99999 PR PBB SHADOW E&M-EST. PATIENT-LVL III: CPT | Mod: PBBFAC,,, | Performed by: ORTHOPAEDIC SURGERY

## 2023-09-14 PROCEDURE — 3008F BODY MASS INDEX DOCD: CPT | Mod: CPTII,S$GLB,, | Performed by: ORTHOPAEDIC SURGERY

## 2023-09-14 PROCEDURE — 3074F PR MOST RECENT SYSTOLIC BLOOD PRESSURE < 130 MM HG: ICD-10-PCS | Mod: CPTII,S$GLB,, | Performed by: ORTHOPAEDIC SURGERY

## 2023-09-14 PROCEDURE — 99203 PR OFFICE/OUTPT VISIT, NEW, LEVL III, 30-44 MIN: ICD-10-PCS | Mod: S$GLB,,, | Performed by: ORTHOPAEDIC SURGERY

## 2023-09-14 PROCEDURE — 99203 OFFICE O/P NEW LOW 30 MIN: CPT | Mod: S$GLB,,, | Performed by: ORTHOPAEDIC SURGERY

## 2023-09-14 PROCEDURE — 73030 X-RAY EXAM OF SHOULDER: CPT | Mod: TC,RT

## 2023-09-14 PROCEDURE — 73030 X-RAY EXAM OF SHOULDER: CPT | Mod: 26,RT,, | Performed by: RADIOLOGY

## 2023-09-14 PROCEDURE — 3044F HG A1C LEVEL LT 7.0%: CPT | Mod: CPTII,S$GLB,, | Performed by: ORTHOPAEDIC SURGERY

## 2023-09-14 PROCEDURE — 3078F PR MOST RECENT DIASTOLIC BLOOD PRESSURE < 80 MM HG: ICD-10-PCS | Mod: CPTII,S$GLB,, | Performed by: ORTHOPAEDIC SURGERY

## 2023-09-14 NOTE — PROGRESS NOTES
CC: RIGHT shoulder pain     52 y.o. Male with a 1 month history of right shoulder atraumatic pain. Patient works as . Started after he has been doing lateral raises at the gym.     He states that the pain is severe and not responding to any conservative care.      He reports that the pain and weakness is worse with overhead activity. It also bothers him at night.    Is affecting ADLs.  Pain is 7/10 at it's worst.      Past Medical History:   Diagnosis Date    Family history of prostate cancer 4/13/2017    Father and brother.    Mixed hyperlipidemia 11/25/2014       Past Surgical History:   Procedure Laterality Date    LIPOMA RESECTION         Family History   Problem Relation Age of Onset    Cancer Father 77        prostate cancer    Heart disease Father     Cancer Brother 58        Prostate cancer.    Heart disease Mother        No current outpatient medications on file.    Review of patient's allergies indicates:  No Known Allergies       REVIEW OF SYSTEMS:  Constitution: Negative. Negative for chills, fever and night sweats.   HENT: Negative for congestion and headaches.    Eyes: Negative for blurred vision, left vision loss and right vision loss.   Cardiovascular: Negative for chest pain and syncope.   Respiratory: Negative for cough and shortness of breath.    Endocrine: Negative for polydipsia, polyphagia and polyuria.   Hematologic/Lymphatic: Negative for bleeding problem. Does not bruise/bleed easily.   Skin: Negative for dry skin, itching and rash.   Musculoskeletal: Negative for falls.  Positive for right shoulder pain and muscle weakness.   Gastrointestinal: Negative for abdominal pain and bowel incontinence.   Genitourinary: Negative for bladder incontinence and nocturia.   Neurological: Negative for disturbances in coordination, loss of balance and seizures.   Psychiatric/Behavioral: Negative for depression. The patient does not have insomnia.    Allergic/Immunologic: Negative for  "hives and persistent infections.      PHYSICAL EXAMINATION:  Vitals:  /68   Pulse 67   Ht 5' 8" (1.727 m)   Wt 78 kg (171 lb 15.3 oz)   BMI 26.15 kg/m²    General: The patient is alert and oriented x 3.  Mood is pleasant.  Observation of ears, eyes and nose reveal no gross abnormalities.  No labored breathing observed.  Gait is coordinated. Patient can toe walk and heel walk without difficulty.      RIGHT SHOULDER / UPPER EXTREMITY EXAM    OBSERVATION:     Swelling  none  Deformity  none   Discoloration  none   Scapular winging none   Scars   none  Atrophy  none    TENDERNESS / CREPITUS (T/C):          T/C      T/C   Clavicle   -/-  SUPRAspinatus    +/-     AC Jt.    -/-  INFRAspinatus  -/-    SC Jt.    -/-  Deltoid    -/-      G. Tuberosity  -/-  LH BICEP groove  -/-   Acromion:  -/-  Midline Neck   -/-     Scapular Spine -/-  Trapezium   -/-   SMA Scapula  -/-  GH jt. line - post  -/-     Scapulothoracic  -/-         ROM: (* = with pain)  Left shoulder   Right shoulder        AROM (PROM)   AROM (PROM)   FE    170° (175°)     170° (175°)     ER at 0°    60°  (65°)    60°  (65°)   ER at 90° ABD  90°  (90°)    90°  (90°)   IR at 90°  ABD   NA  (40°)     NA  (40°)      IR (spine level)   T10     T10    STRENGTH: (* = with pain) Left shoulder   Right shoulder   SCAPTION   5/5    5/5    IR    5/5    5/5   ER    5/5    5/5   BICEPS   5/5    5/5   Deltoid    5/5    5/5     SIGNS:  Painful side       NEER   +    OSUSANNAHS  neg    BYRNES   +    SPEEDS  neg     DROP ARM   -   BELLY PRESS neg   Superior escape none    LIFT-OFF  neg   X-Body ADD    neg    MOVING VALGUS neg        STABILITY TESTING    Left shoulder   Right shoulder    Translation     Anterior  up face     up face    Posterior  up face    up face    Sulcus   < 10mm    < 10 mm     Signs   Apprehension   neg      neg       Relocation   no change     no change      Jerk test  neg     neg    EXTREMITY NEURO-VASCULAR EXAM:    Sensation grossly intact " to light touch all dermatomal regions.    DTR 2+ Biceps, Triceps, BR and Negative Neris sign   Grossly intact motor function at Elbow, Wrist and Hand   Distal pulses radial and ulnar 2+, brisk cap refill, symmetric.      NECK:  Painless FROM and spinous processes non-tender. Negative Spurlings sign.      OTHER FINDINGS:  - scapular dyskinesia    XRAYS:  Xrays including AP, Outlet and Axillary Lateral of shoulder are ordered / images reviewed by me:   No fracture dislocation or other pathology   Proximal migration of humeral head: None   GH arthritis: None          ASSESSMENT:   Right shoulder pain:  1. Right shoulder pain, unspecified chronicity        PLAN:      1. MRI to rule out rotator cuff tear  2. Follow up in clinic to discuss MRI results    All questions were answered, patient will contact us for questions or concerns in the interim.

## 2023-09-25 PROBLEM — Z00.00 ROUTINE GENERAL MEDICAL EXAMINATION AT A HEALTH CARE FACILITY: Status: RESOLVED | Noted: 2023-06-23 | Resolved: 2023-09-25

## 2023-09-25 PROBLEM — Z00.00 HEALTHCARE MAINTENANCE: Status: RESOLVED | Noted: 2023-06-23 | Resolved: 2023-09-25

## 2023-09-27 ENCOUNTER — HOSPITAL ENCOUNTER (OUTPATIENT)
Dept: RADIOLOGY | Facility: HOSPITAL | Age: 53
Discharge: HOME | End: 2023-09-27
Attending: STUDENT IN AN ORGANIZED HEALTH CARE EDUCATION/TRAINING PROGRAM
Payer: COMMERCIAL

## 2023-09-27 DIAGNOSIS — M25.511 ACUTE PAIN OF RIGHT SHOULDER: ICD-10-CM

## 2023-09-27 PROCEDURE — 73221 MRI SHOULDER WITHOUT CONTRAST RIGHT: ICD-10-PCS | Mod: 26,RT,, | Performed by: RADIOLOGY

## 2023-09-27 PROCEDURE — 73221 MRI JOINT UPR EXTREM W/O DYE: CPT | Mod: 26,RT,, | Performed by: RADIOLOGY

## 2023-09-27 PROCEDURE — 73221 MRI JOINT UPR EXTREM W/O DYE: CPT | Mod: TC,RT

## 2023-09-28 ENCOUNTER — OFFICE VISIT (OUTPATIENT)
Dept: SPORTS MEDICINE | Facility: CLINIC | Age: 53
End: 2023-09-28
Payer: COMMERCIAL

## 2023-09-28 DIAGNOSIS — M25.511 ACUTE PAIN OF RIGHT SHOULDER: Primary | ICD-10-CM

## 2023-09-28 PROCEDURE — 99499 UNLISTED E&M SERVICE: CPT | Mod: 93,,, | Performed by: ORTHOPAEDIC SURGERY

## 2023-09-28 PROCEDURE — 99499 NO LOS: ICD-10-PCS | Mod: 93,,, | Performed by: ORTHOPAEDIC SURGERY

## 2023-09-28 NOTE — PROGRESS NOTES
Telemedicine/Virtual Visit Documentation:     The patient location is: home    The chief complaint leading to consultation is: see HPI    VISIT TYPE X   Virtual visit with synchronous audio and video    Telephone E/M service x     Total time spent with patient: see X my on chart below.   More than half of the time was spent counseling or coordinating care including prognosis, differential diagnosis, risks and benefits of treatment, instructions, compliance risk reductions     EST MINUTES X   [id number] 5    [id number] 10    35070 15 x   99214 25    [id number] 40    NEW     19632 10    89983 20    51440 30    33061 45    40849 60    PHONE      5-10    04205 11-20    20457 21-30      H&P  Orthopaedics      SUBJECTIVE:     History of Present Illness:  Patient is a 52 y.o. male presents for MRI review of right shoulder.  Concern for rotator cuff tear. Patient does not report any new incidents or injuries since their last appointment. Pain and symptoms remain unchanged since his last appointment. Here today to discuss treatment options.       Review of patient's allergies indicates:  No Known Allergies    Past Medical History:   Diagnosis Date    Family history of prostate cancer 4/13/2017    Father and brother.    Mixed hyperlipidemia 11/25/2014     Past Surgical History:   Procedure Laterality Date    LIPOMA RESECTION       Family History   Problem Relation Age of Onset    Cancer Father 77        prostate cancer    Heart disease Father     Cancer Brother 58        Prostate cancer.    Heart disease Mother      Social History     Tobacco Use    Smoking status: Never   Substance Use Topics    Alcohol use: Yes     Comment: Socially    Drug use: No        Review of Systems:  Patient denies constitutional symptoms, cardiac symptoms, respiratory symptoms, GI symptoms.  The remainder of the musculoskeletal ROS is included in the HPI.      OBJECTIVE:     Physical Exam:  Gen:  No acute distress  CV:  Peripherally well-perfused.  Pulses  2+ bilaterally.  Lungs:  Normal respiratory effort.  Abdomen:  Soft, non-tender, non-distended  Head/Neck:  Normocephalic.  Atraumatic. No TTP, AROM and PROM intact without pain  Neuro:  CN intact without deficit, SILT throughout B/L Upper & Lower Extremities    MSK:  MRI right shoulder (9/27/23):   FINDINGS:  ROTATOR CUFF:     Supraspinatus: Intact.  No tendinosis.     Infraspinatus: Intact.  No tendinosis.     Subscapularis: Intact.  No tendinosis.     Teres Minor: Intact.  No tendinosis.     There is mild fluid within the subacromial/subdeltoid bursa.     LABRUM: Grossly unremarkable on this standard non arthrogram exam. Anterior inferior labrum appears intact.Posterior labrum is unremarkable.     LONG HEAD BICEPS TENDON: Located within bicipital groove and intact.Biceps-labral anchor is intact. No tendinosis.  No tenosynovitis. Rotator Interval is normal. Biceps pulley is intact.     BONES: No evident fracture.Visualized marrow within normal limits. AC joint demonstrates normal alignment with moderate hypertrophy.No significant osteo-acromial outlet narrowing (with mass effect on rotator cuff myotendinous junction) due to lateral downsloping of acromion or acromial spur.  There is no evident os acromiale.     CARTILAGE: Preserved without focal defects or subchondral marrow edema.No synovial abnormality or intra-articular loose bodies. Glenoid fossa demonstrates no sclerosis.     MUSCLES:  Normal bulk and signal.     Impression:     Mild subacromial subdeltoid bursitis.  No evidence for rotator cuff tear, labral tear, or occult osseous injury.    No imaging was obtained for this visit as patient was unable to complete an in clinic visit.    ASSESSMENT/PLAN:     A/P: Enrique Jimenez is a 52 y.o. with right shoulder pain   Subacromial bursitis.     Plan:  - PT    - f/u in 3 months.

## 2023-10-13 ENCOUNTER — CLINICAL SUPPORT (OUTPATIENT)
Dept: REHABILITATION | Facility: HOSPITAL | Age: 53
End: 2023-10-13
Attending: ORTHOPAEDIC SURGERY
Payer: COMMERCIAL

## 2023-10-13 DIAGNOSIS — G89.29 CHRONIC RIGHT SHOULDER PAIN: ICD-10-CM

## 2023-10-13 DIAGNOSIS — M25.511 CHRONIC RIGHT SHOULDER PAIN: ICD-10-CM

## 2023-10-13 DIAGNOSIS — M25.511 ACUTE PAIN OF RIGHT SHOULDER: ICD-10-CM

## 2023-10-13 PROCEDURE — 97112 NEUROMUSCULAR REEDUCATION: CPT

## 2023-10-13 PROCEDURE — 97161 PT EVAL LOW COMPLEX 20 MIN: CPT

## 2023-10-13 PROCEDURE — 97140 MANUAL THERAPY 1/> REGIONS: CPT

## 2023-10-16 NOTE — PLAN OF CARE
[facility]  Physical Therapy Initial Evaluation    Name: Enrique Jimenez  Clinic Number: [id number]    Therapy Diagnosis:   Encounter Diagnoses   Name Primary?    Acute pain of right shoulder     Chronic right shoulder pain      Physician: Jose Parikh MD    Physician Orders: PT Eval and Treat  Medical Diagnosis from Referral: M25.511 (ICD-10-CM) - Acute pain of right shoulder  Evaluation Date: 10/13/2023  Authorization Period Expiration: 12/31/2023  Plan of Care Expiration: 01/13/2024  Visit # / Visits authorized: 1/1    FOTO: 70  FOTO 1st Follow-up: NA  FOTO 2nd Follow-up: NA    Time In: 1200  Time Out: 1300  Total Billable Time: 60 minutes    Precautions: Standard    Subjective     Date of onset: 08/01/2023  History of current condition - Enrique reports: acute onset of R-sided shoulder pain that has been present for the past few months. He had been performing weightlifting for close to a year; normally tried to avoid OH lifting, but worked out with his son during the Summer. Followed his son's program and gradually started to lose OH range 2/2 pain. Issues after passing shoulder height.  Tried to stop performing overhead lifting, but did not feel much improvement. Had an MRI but cleared him of any RC pathology.  Has not worked out in 2 weeks and feels like sx behavior has improved, but he wants to get back into lifting.  Lost 8 pounds with regular exercise and wants to continue to program.  Denies N/T; denies weakness in the hands     Imaging: MRI R shoulder (9/27/2023)  Impression:  Mild subacromial subdeltoid bursitis.  No evidence for rotator cuff tear, labral tear, or occult osseous injury.    Prior Therapy: None  Social History: Lives with family  Occupation:   Prior Level of Function: no issues  Current Level of Function: issues with OH motion    Pain:  Current 2/10, worst 6/10, best 1/10   Location: right shoulder  Description: Aching, Dull, and  Throbbing  Aggravating Factors: Extension, Flexing, and Lifting  Easing Factors: rest    Pts Goals: Return to pain-free recreational lifting    Medical History:   Past Medical History:   Diagnosis Date    Family history of prostate cancer 4/13/2017    Father and brother.    Mixed hyperlipidemia 11/25/2014       Surgical History:   Enrique Jimenez  has a past surgical history that includes Lipoma resection.    Medications:   Enrique currently has no medications in their medication list.    Allergies:   Review of patient's allergies indicates:  No Known Allergies     Objective     Observation: R GHJ rests in ANT position    Passive Range of Motion:   Shoulder Right Left   Flexion 165* 174   Abduction 155* 170   ER at 0 45 45   ER at 90 55 65   IR full full   * = pain      Active Range of Motion:   Shoulder Right Left   Flexion 155* 170   Abduction 150* 168   * = pain     Strength:  Shoulder Right Left   Flexion 5/5 5/5   Abduction 5/5 5/5   ER 4/5 5/5   IR 5/5 5/5   Serratus Anterior 4/5 5/5   Middle Trap 3+/5 4/5   Low Trap 3+/5 4+/5     Special Tests:  Impingement Cluster:   Right Left   Hawkin's Kenndy + -   Painful Arc - -   Resisted ER - -       Intake Outcome Measure for FOTO Shoulder Survey    Therapist reviewed FOTO scores for Enrique Jimenez on 10/13/2023.   FOTO documents entered into [employer] - see Media section.    Intake Score: 70%     Treatment     Treatment Time In: 1200  Treatment Time Out: 1300  Total Treatment time separate from Evaluation: 45 minutes    Enrique received the treatments listed below:      Therapeutic exercises to develop strength, endurance, ROM, posture, and core stabilization for 0 minutes including:    Manual therapy techniques: Joint mobilizations, Manual traction, Myofacial release, and Soft tissue Mobilization were applied to the: R shoulder for 15 minutes, including:  R subscap release  R lat mobility    Neuromuscular re-education activities to improve: Balance, Coordination,  Kinesthetic, Proprioception, and Posture for 15 minutes. The following activities were included:  Thread the needle  Lat stretch at edge of table  Open books 2 x 10  No-Money 2 x 15  Prone Scap Setting  SL ER 2 x 15     Therapeutic activities to improve functional performance for 0 minutes, including:    Home Exercises and Patient Education Provided     Education provided:   - GHJ dynamic control  - Prognosis, activity modification, goals for therapy, role of therapy for care, exercises/HEP    Written Home Exercises Provided:  Exercises were reviewed and Enrique was able to demonstrate them prior to the end of the session.   Pt received a written copy of exercises to perform at home. Enrique demonstrated good understanding of the education provided.     See EMR under patient instructions for exercises given.     Assessment     Enrique is a 53 y.o. male referred to outpatient Physical Therapy with acute R-sided shoulder pain 2/2 decreased RC dynamic GHJ control. Deficits include decreased R shoulder FLEX/ABD end-range, weakness periscapular region, and stiffness throughout t-sp. Discussed GHJ dynamic control with loaded motion; goal to return to recreational lifting.    Pt will benefit from skilled outpatient Physical Therapy to address the deficits stated above and in the chart below, provide pt/family education, and to maximize pt's level of independence. Pt prognosis is Good.     Plan of care discussed with patient: Yes  Pt's spiritual, cultural and educational needs considered and patient is agreeable to the plan of care and goals as stated below:     Anticipated Barriers for therapy: None    Medical Necessity is demonstrated by the following:    History  Co-morbidities and personal factors that may impact the plan of care [x] LOW: no personal factors / co-morbidities  [] MODERATE: 1-2 personal factors / co-morbidities  [] HIGH: 3+ personal factors / co-morbidities    Moderate / High Support Documentation:    Co-morbidities affecting plan of care: None    Personal Factors:   No deficits     Examination  Body Structures and Functions, activity limitations and participation restrictions that may impact the plan of care [x] LOW: addressing 1-2 elements  [] MODERATE: 3+ elements  [] HIGH: 4+ elements (please support below)    Moderate / High Support Documentation: None     Clinical Presentation [x] LOW: stable  [] MODERATE: Evolving  [] HIGH: Unstable     Decision Making/ Complexity Score: low       GOALS   Short Term Goals: 4 weeks  Pt will report decreased R shoulder pain  < / =  3/10  to increase tolerance for ADL performance and recreational routine.  Pt will improve R shoulder ROM to WFL in order to be able to perform ADLs without difficulty.  Pt will improve R shoulder strength by 1/3 MMT grade to increase tolerance for ADL and work activities  Pt will demonstrate tolerance to HEP to improve independence with ADL's    Long Term Goals: 8 weeks  Pt will report decreased R shoulder pain  < / =  1/10  to increase tolerance for ADL performance and recreational routine  Pt will improve R shoulder ROM to WNL in order to be able to perform ADLs without difficulty  Pt will improve R shoulder strength by 1/3 MMT grade to increase tolerance for ADL and work activities  Pt will report at CJ level (20-40% impaired) on FOTO Shoulder to demonstrate increase in LE function with every day tasks.     Plan   Plan of care Certification: 10/13/2023 to 01/13/2023.    Outpatient Physical Therapy 1-2 times weekly for 8-12 weeks to include the following interventions: Gait Training, Manual Therapy, Moist Heat/ Ice, Neuromuscular Re-ed, Patient Education, Therapeutic Activites, Therapeutic Exercise, and Functional Dry Needling with/or without Electrical Stimulation as needed.     Edy Andrea, PT , DPT, OCS  Board Certified in Orthopedic Physical Therapy

## 2023-10-20 ENCOUNTER — CLINICAL SUPPORT (OUTPATIENT)
Dept: REHABILITATION | Facility: HOSPITAL | Age: 53
End: 2023-10-20
Payer: COMMERCIAL

## 2023-10-20 DIAGNOSIS — G89.29 CHRONIC RIGHT SHOULDER PAIN: Primary | ICD-10-CM

## 2023-10-20 DIAGNOSIS — M25.511 CHRONIC RIGHT SHOULDER PAIN: Primary | ICD-10-CM

## 2023-10-20 PROCEDURE — 97112 NEUROMUSCULAR REEDUCATION: CPT

## 2023-10-20 PROCEDURE — 97140 MANUAL THERAPY 1/> REGIONS: CPT

## 2023-10-20 PROCEDURE — 97530 THERAPEUTIC ACTIVITIES: CPT

## 2023-10-20 NOTE — PROGRESS NOTES
Physical Therapy Daily Treatment Note     Name: Enrique Jimenez  Clinic Number: [id number]    Therapy Diagnosis:   Encounter Diagnosis   Name Primary?    Chronic right shoulder pain Yes     Physician: Jose Parikh MD    Visit Date: 10/20/2023    Physician Orders: PT Eval and Treat  Medical Diagnosis from Referral: M25.511 (ICD-10-CM) - Acute pain of right shoulder  Evaluation Date: 10/13/2023  Authorization Period Expiration: 12/31/2023  Plan of Care Expiration: 01/13/2024  Visit # / Visits authorized: 1/20 (+ EVAL)     FOTO: 70  FOTO 1st Follow-up: NA  FOTO 2nd Follow-up: NA     Time In: 1100  Time Out: 1200  Total Billable Time: 60 minutes     Precautions: Standard    Subjective     Pt reports mild improvement in end-range shoulder pain since last session.  He was compliant with home exercise program.  Response to previous treatment: Good  Functional change: improved OH motion    Pain: 2/10  Location: right shoulder      Objective     Observation: R GHJ rests in ANT position     Passive Range of Motion:   Shoulder Right Left   Flexion 165* 174   Abduction 155* 170   ER at 0 45 45   ER at 90 55 65   IR full full   * = pain       Treatment     Enrique received the treatments listed below:       Therapeutic exercises to develop strength, endurance, ROM, posture, and core stabilization for 0 minutes including:     Manual therapy techniques: Joint mobilizations, Manual traction, Myofacial release, and Soft tissue Mobilization were applied to the: R shoulder for 25 minutes, including:  R subscap release  R lat mobility  R UT/Lev Scap release     Neuromuscular re-education activities to improve: Balance, Coordination, Kinesthetic, Proprioception, and Posture for 25 minutes. The following activities were included:  Thread the needle  Lat stretch at edge of table  Open books 2 x 10  No-Money 2 x 15  Prone W's 3 x 12     Therapeutic activities to improve functional performance for 10 minutes, including:  ER Kirt OH  Lift 2 x 12  OH carry 10 laps     Home Exercises and Patient Education Provided      Education provided:   - GHJ dynamic control  - Prognosis, activity modification, goals for therapy, role of therapy for care, exercises/HEP     Written Home Exercises Provided:  Exercises were reviewed and Enrique was able to demonstrate them prior to the end of the session.   Pt received a written copy of exercises to perform at home. Enrique demonstrated good understanding of the education provided.      See EMR under patient instructions for exercises given.     Assessment     acute R-sided shoulder pain 2/2 decreased RC dynamic GHJ control  First return since original session; reported improved end-range ROM without impingement at GHJ. Progressed to include more prone-based scap setting and progression to OH capacity. Good motor control on all interventions with only minimal cues from PT.  Enrique Is progressing well towards his goals.   Pt prognosis is Good.     Pt will continue to benefit from skilled outpatient physical therapy to address the deficits listed in the problem list box on initial evaluation, provide pt/family education and to maximize pt's level of independence in the home and community environment.     Pt's spiritual, cultural and educational needs considered and pt agreeable to plan of care and goals.    Anticipated barriers to physical therapy: None    GOALS   Short Term Goals: 4 weeks  Pt will report decreased R shoulder pain  < / =  3/10  to increase tolerance for ADL performance and recreational routine.  Pt will improve R shoulder ROM to WFL in order to be able to perform ADLs without difficulty.  Pt will improve R shoulder strength by 1/3 MMT grade to increase tolerance for ADL and work activities  Pt will demonstrate tolerance to HEP to improve independence with ADL's     Long Term Goals: 8 weeks  Pt will report decreased R shoulder pain  < / =  1/10  to increase tolerance for ADL performance and  recreational routine  Pt will improve R shoulder ROM to WNL in order to be able to perform ADLs without difficulty  Pt will improve R shoulder strength by 1/3 MMT grade to increase tolerance for ADL and work activities  Pt will report at CJ level (20-40% impaired) on FOTO Shoulder to demonstrate increase in LE function with every day tasks.     Plan     Continue per PHUONG Andrea, PT, DPT  Board Certified in Orthopedic Physical Therapy

## 2023-10-27 ENCOUNTER — CLINICAL SUPPORT (OUTPATIENT)
Dept: REHABILITATION | Facility: HOSPITAL | Age: 53
End: 2023-10-27
Payer: COMMERCIAL

## 2023-10-27 DIAGNOSIS — M25.511 CHRONIC RIGHT SHOULDER PAIN: Primary | ICD-10-CM

## 2023-10-27 DIAGNOSIS — G89.29 CHRONIC RIGHT SHOULDER PAIN: Primary | ICD-10-CM

## 2023-10-27 PROCEDURE — 97112 NEUROMUSCULAR REEDUCATION: CPT

## 2023-10-27 PROCEDURE — 97530 THERAPEUTIC ACTIVITIES: CPT

## 2023-10-27 PROCEDURE — 97140 MANUAL THERAPY 1/> REGIONS: CPT

## 2023-10-27 NOTE — PROGRESS NOTES
Physical Therapy Daily Treatment Note     Name: Enrique Jimenez  Clinic Number: [id number]    Therapy Diagnosis:   Encounter Diagnosis   Name Primary?    Chronic right shoulder pain Yes     Physician: Jose Parikh MD    Visit Date: 10/27/2023    Physician Orders: PT Eval and Treat  Medical Diagnosis from Referral: M25.511 (ICD-10-CM) - Acute pain of right shoulder  Evaluation Date: 10/13/2023  Authorization Period Expiration: 12/31/2023  Plan of Care Expiration: 01/13/2024  Visit # / Visits authorized: 2/20 (+ EVAL)     FOTO: 70  FOTO 1st Follow-up: NA  FOTO 2nd Follow-up: NA     Time In: 1100  Time Out: 1200  Total Billable Time: 60 minutes     Precautions: Standard    Subjective     Pt reports continued improvement in OH tolerance and ROM; mild discomfort R scap superior angle while sleeping.  He was compliant with home exercise program.  Response to previous treatment: Good  Functional change: improved OH motion    Pain: 2/10  Location: right shoulder      Objective     Observation: R GHJ rests in ANT position     Passive Range of Motion:   Shoulder Right Left   Flexion 165* 174   Abduction 155* 170   ER at 0 45 45   ER at 90 55 65   IR full full   * = pain       Treatment     Enrique received the treatments listed below:       Therapeutic exercises to develop strength, endurance, ROM, posture, and core stabilization for 0 minutes including:     Manual therapy techniques: Joint mobilizations, Manual traction, Myofacial release, and Soft tissue Mobilization were applied to the: R shoulder for 25 minutes, including:  R subscap release  R lat mobility  R UT/Lev Scap release     Neuromuscular re-education activities to improve: Balance, Coordination, Kinesthetic, Proprioception, and Posture for 25 minutes. The following activities were included:  Thread the needle  Lat stretch at edge of table  Open books 2 x 10  No-Money 2 x 15  Prone W's 3 x 12     Therapeutic activities to improve functional performance for  10 minutes, including:  ER Kirt OH Lift 2 x 12  OH carry 10 laps     Home Exercises and Patient Education Provided      Education provided:   - GHJ dynamic control  - Prognosis, activity modification, goals for therapy, role of therapy for care, exercises/HEP     Written Home Exercises Provided:  Exercises were reviewed and Enrique was able to demonstrate them prior to the end of the session.   Pt received a written copy of exercises to perform at home. Enrique demonstrated good understanding of the education provided.      See EMR under patient instructions for exercises given.     Assessment     acute R-sided shoulder pain 2/2 decreased RC dynamic GHJ control  Worked in more MT to R superior angle and UT/Lev Scap origin. Progressed scap activities to include more OH/ER/ABD activities with absent sx behavior.   Enrique Is progressing well towards his goals.   Pt prognosis is Good.     Pt will continue to benefit from skilled outpatient physical therapy to address the deficits listed in the problem list box on initial evaluation, provide pt/family education and to maximize pt's level of independence in the home and community environment.     Pt's spiritual, cultural and educational needs considered and pt agreeable to plan of care and goals.    Anticipated barriers to physical therapy: None    GOALS   Short Term Goals: 4 weeks  Pt will report decreased R shoulder pain  < / =  3/10  to increase tolerance for ADL performance and recreational routine. MET  Pt will improve R shoulder ROM to WFL in order to be able to perform ADLs without difficulty. MET  Pt will improve R shoulder strength by 1/3 MMT grade to increase tolerance for ADL and work activities PROGRESSING  Pt will demonstrate tolerance to HEP to improve independence with ADL's PROGRESSING     Long Term Goals: 8 weeks  Pt will report decreased R shoulder pain  < / =  1/10  to increase tolerance for ADL performance and recreational routine  Pt will improve R  shoulder ROM to WNL in order to be able to perform ADLs without difficulty  Pt will improve R shoulder strength by 1/3 MMT grade to increase tolerance for ADL and work activities  Pt will report at CJ level (20-40% impaired) on FOTO Shoulder to demonstrate increase in LE function with every day tasks.     Plan     Continue per PHUONG Andrea, PT, DPT  Board Certified in Orthopedic Physical Therapy   71F, pmh of htn, hld, asthma, presenting with dizziness. has had vomiting. no headache, fever, chest pain, shortness of breath, abdominal pain, pain or burning with urination.

## 2023-11-10 ENCOUNTER — CLINICAL SUPPORT (OUTPATIENT)
Dept: REHABILITATION | Facility: HOSPITAL | Age: 53
End: 2023-11-10
Payer: COMMERCIAL

## 2023-11-10 DIAGNOSIS — M25.511 CHRONIC RIGHT SHOULDER PAIN: Primary | ICD-10-CM

## 2023-11-10 DIAGNOSIS — G89.29 CHRONIC RIGHT SHOULDER PAIN: Primary | ICD-10-CM

## 2023-11-10 PROCEDURE — 97530 THERAPEUTIC ACTIVITIES: CPT

## 2023-11-10 PROCEDURE — 97112 NEUROMUSCULAR REEDUCATION: CPT

## 2023-11-13 NOTE — PROGRESS NOTES
Physical Therapy Daily Treatment Note     Name: Enrique Jimenez  Clinic Number: [id number]    Therapy Diagnosis:   Encounter Diagnosis   Name Primary?    Chronic right shoulder pain Yes     Physician: Jose Parikh MD    Visit Date: 11/10/2023    Physician Orders: PT Eval and Treat  Medical Diagnosis from Referral: M25.511 (ICD-10-CM) - Acute pain of right shoulder  Evaluation Date: 10/13/2023  Authorization Period Expiration: 12/31/2023  Plan of Care Expiration: 01/13/2024  Visit # / Visits authorized: 3/20 (+ EVAL)     FOTO: 70  FOTO 1st Follow-up: NA  FOTO 2nd Follow-up: NA     Time In: 1100  Time Out: 1200  Total Billable Time: 60 minutes     Precautions: Standard    Subjective     Pt reports first return in 2 weeks; feels more functional strength, but still having some pain with mid-range lifts.  He was compliant with home exercise program.  Response to previous treatment: Good  Functional change: improved OH motion    Pain: 2/10  Location: right shoulder      Objective     Observation: R GHJ rests in ANT position     Passive Range of Motion:   Shoulder Right Left   Flexion 165* 174   Abduction 155* 170   ER at 0 45 45   ER at 90 55 65   IR full full   * = pain       Treatment     PT Tech Extender utilized under supervision throughout tx session    Enrique received the treatments listed below:       Therapeutic exercises to develop strength, endurance, ROM, posture, and core stabilization for 0 minutes including:     Manual therapy techniques: Joint mobilizations, Manual traction, Myofacial release, and Soft tissue Mobilization were applied to the: R shoulder for 25 minutes, including:  R subscap release  R lat mobility  R UT/Lev Scap release     Neuromuscular re-education activities to improve: Balance, Coordination, Kinesthetic, Proprioception, and Posture for 25 minutes. The following activities were included:  Thread the needle  Lat stretch at edge of table  Open books 2 x 10  No-Money 2 x 15  Prone  W's 3 x 12     Therapeutic activities to improve functional performance for 10 minutes, including:  ER Kirt OH Lift 2 x 12  OH carry 10 laps     Home Exercises and Patient Education Provided      Education provided:   - GHJ dynamic control  - Prognosis, activity modification, goals for therapy, role of therapy for care, exercises/HEP     Written Home Exercises Provided:  Exercises were reviewed and Enrique was able to demonstrate them prior to the end of the session.   Pt received a written copy of exercises to perform at home. Enrique demonstrated good understanding of the education provided.      See EMR under patient instructions for exercises given.     Assessment     acute R-sided shoulder pain 2/2 decreased RC dynamic GHJ control  Improvement in tolerance to OH activities, but still experiencing an area of impingement in mid-range. Able to clear region, but will work on progressively moving that range to higher elevation with elimination of goal.  Enrique Is progressing well towards his goals.   Pt prognosis is Good.     Pt will continue to benefit from skilled outpatient physical therapy to address the deficits listed in the problem list box on initial evaluation, provide pt/family education and to maximize pt's level of independence in the home and community environment.     Pt's spiritual, cultural and educational needs considered and pt agreeable to plan of care and goals.    Anticipated barriers to physical therapy: None    GOALS   Short Term Goals: 4 weeks  Pt will report decreased R shoulder pain  < / =  3/10  to increase tolerance for ADL performance and recreational routine. MET  Pt will improve R shoulder ROM to WFL in order to be able to perform ADLs without difficulty. MET  Pt will improve R shoulder strength by 1/3 MMT grade to increase tolerance for ADL and work activities PROGRESSING  Pt will demonstrate tolerance to HEP to improve independence with ADL's PROGRESSING     Long Term Goals: 8  weeks  Pt will report decreased R shoulder pain  < / =  1/10  to increase tolerance for ADL performance and recreational routine  Pt will improve R shoulder ROM to WNL in order to be able to perform ADLs without difficulty  Pt will improve R shoulder strength by 1/3 MMT grade to increase tolerance for ADL and work activities  Pt will report at CJ level (20-40% impaired) on FOTO Shoulder to demonstrate increase in LE function with every day tasks.     Plan     Continue per PHUONG Andrea, PT, DPT  Board Certified in Orthopedic Physical Therapy

## 2023-12-14 ENCOUNTER — PATIENT MESSAGE (OUTPATIENT)
Dept: INTERNAL MEDICINE | Facility: CLINIC | Age: 53
End: 2023-12-14
Payer: COMMERCIAL

## 2024-01-17 ENCOUNTER — OFFICE VISIT (OUTPATIENT)
Dept: UROLOGY | Facility: CLINIC | Age: 54
End: 2024-01-17
Payer: COMMERCIAL

## 2024-01-17 VITALS
SYSTOLIC BLOOD PRESSURE: 139 MMHG | BODY MASS INDEX: 27.19 KG/M2 | WEIGHT: 179.44 LBS | HEIGHT: 68 IN | HEART RATE: 67 BPM | DIASTOLIC BLOOD PRESSURE: 79 MMHG

## 2024-01-17 DIAGNOSIS — Z80.42 FAMILY HISTORY OF PROSTATE CANCER: Primary | ICD-10-CM

## 2024-01-17 PROCEDURE — 3078F DIAST BP <80 MM HG: CPT | Mod: CPTII,S$GLB,, | Performed by: UROLOGY

## 2024-01-17 PROCEDURE — 3008F BODY MASS INDEX DOCD: CPT | Mod: CPTII,S$GLB,, | Performed by: UROLOGY

## 2024-01-17 PROCEDURE — 99999 PR PBB SHADOW E&M-EST. PATIENT-LVL III: CPT | Mod: PBBFAC,,, | Performed by: UROLOGY

## 2024-01-17 PROCEDURE — 3075F SYST BP GE 130 - 139MM HG: CPT | Mod: CPTII,S$GLB,, | Performed by: UROLOGY

## 2024-01-17 PROCEDURE — 99204 OFFICE O/P NEW MOD 45 MIN: CPT | Mod: S$GLB,,, | Performed by: UROLOGY

## 2024-01-17 PROCEDURE — 1159F MED LIST DOCD IN RCRD: CPT | Mod: CPTII,S$GLB,, | Performed by: UROLOGY

## 2024-01-17 NOTE — PROGRESS NOTES
[facility]  Urologic Oncology      Date of Service: 01/17/2024    Chief Complaint/Reason for Consultation: Prostate check.     Requesting Provider:   Self, Aaareferral  No address on file      History of Present Illness:   Patient is a 53 y.o. male presenting for evaluation of his prostate.   Family history: father and three brothers have prostate cancer. Father diagnosed at 75 years old. Brothers diagnosed at 72, 62 and 63 years old. Metastatic prostate cancer was only present in father. He passed away from prostate cancer.     Older sister passed away from hogdkin's lymphoma. Mother had breast cancer diagnosed at 84. No family history of pancreatic, colon, or ovarian cancer.     His PSA from 06/23/2023 is 1.1.     Denies any LUTS. No nocturia. Denies hematuria.   Good erectile function.   No blood thinners. No abdominal surgeries.   Has three boys.        Lab Results   Component Value Date    PSA 1.1 06/23/2023    PSA 0.98 08/26/2022    PSA 0.95 09/17/2021    PSA 0.94 08/28/2020    PSA 0.79 05/10/2019    PSA 0.93 05/10/2018    PSA 0.82 04/13/2017    PSA 0.81 03/18/2016    PSA 0.62 10/25/2013                 Current Problem List:  Patient Active Problem List    Diagnosis Date Noted    Chronic right shoulder pain 10/13/2023    Ganglion cyst of wrist, left 06/23/2023    Peyronie's disease 02/15/2018    Erectile dysfunction due to arterial insufficiency 02/15/2018    Mixed hyperlipidemia 11/25/2014        Allergies:  Review of patient's allergies indicates:  No Known Allergies     Medications per EMR:  (Not in a hospital admission)      Past Medical History:  Past Medical History:   Diagnosis Date    Family history of prostate cancer 4/13/2017    Father and brother.    Mixed hyperlipidemia 11/25/2014        Past Surgical History:  Past Surgical History:   Procedure Laterality Date    LIPOMA RESECTION          Family History:  Family History   Problem Relation Age of Onset    Cancer Father 77        prostate  cancer    Heart disease Father     Cancer Brother 58        Prostate cancer.    Heart disease Mother         Social History:  Social History     Tobacco Use    Smoking status: Never    Smokeless tobacco: Not on file   Substance Use Topics    Alcohol use: Yes     Comment: Socially          OBJECTIVE:     There were no vitals filed for this visit.       General Appearance: Alert, cooperative, no distress  Head: Normocephalic  Eyes: Clear conjunctiva  Neck: No obvious LND or JVD  Lungs: Normal chest excursion, no accessory muscle use  Chest: Regular rate rhythm by palpation, no pedal edema  Abdomen: Soft, non-tender, non-distended, no rebound, guarding, rigidity  KANE: 30 gram prostate. No nodularity.   Extremities: Atraumatic   Lymph Nodes: No appreciable lymph adenopathy  Neurologic: No gross gait, motor or sensory deficits        LAB:    CBC:  Lab Results   Component Value Date    WBC 5.65 06/23/2023    HGB 16.0 06/23/2023    HCT 46.8 06/23/2023    MCV 90 06/23/2023     06/23/2023         BMP:  Lab Results   Component Value Date     06/23/2023    K 4.8 06/23/2023     06/23/2023    CO2 25 06/23/2023    BUN 14 06/23/2023    CREATININE 1.1 06/23/2023    CALCIUM 9.6 06/23/2023    ANIONGAP 9 06/23/2023    EGFRNORACEVR >60.0 06/23/2023           IMAGING:            ASSESSMENT/PLAN:     Assessment:    Family history of prostate cancer.     Plan:  We discussed that his PSA is stable and his PSA velocity is very reassuring.   Discussed germline testing giving strong family history of prostate cancer. He is amenable to this.   PSA in 6 months. RTC 6 months.

## 2024-01-18 ENCOUNTER — PATIENT MESSAGE (OUTPATIENT)
Dept: HEMATOLOGY/ONCOLOGY | Facility: CLINIC | Age: 54
End: 2024-01-18
Payer: COMMERCIAL

## 2024-06-24 DIAGNOSIS — Z00.00 ROUTINE MEDICAL EXAM: Primary | ICD-10-CM

## 2024-09-13 ENCOUNTER — OFFICE VISIT (OUTPATIENT)
Dept: INTERNAL MEDICINE | Facility: CLINIC | Age: 54
End: 2024-09-13
Payer: COMMERCIAL

## 2024-09-13 ENCOUNTER — CLINICAL SUPPORT (OUTPATIENT)
Dept: INTERNAL MEDICINE | Facility: CLINIC | Age: 54
End: 2024-09-13
Payer: COMMERCIAL

## 2024-09-13 ENCOUNTER — HOSPITAL ENCOUNTER (OUTPATIENT)
Dept: CARDIOLOGY | Facility: HOSPITAL | Age: 54
Discharge: HOME | End: 2024-09-13
Attending: STUDENT IN AN ORGANIZED HEALTH CARE EDUCATION/TRAINING PROGRAM
Payer: COMMERCIAL

## 2024-09-13 VITALS — HEIGHT: 68 IN | WEIGHT: 179 LBS | BODY MASS INDEX: 27.13 KG/M2

## 2024-09-13 VITALS
HEIGHT: 68 IN | HEART RATE: 81 BPM | BODY MASS INDEX: 27.6 KG/M2 | SYSTOLIC BLOOD PRESSURE: 131 MMHG | DIASTOLIC BLOOD PRESSURE: 79 MMHG | WEIGHT: 182.13 LBS

## 2024-09-13 DIAGNOSIS — Z00.00 ROUTINE GENERAL MEDICAL EXAMINATION AT A HEALTH CARE FACILITY: Primary | ICD-10-CM

## 2024-09-13 DIAGNOSIS — Z00.00 ROUTINE MEDICAL EXAM: ICD-10-CM

## 2024-09-13 DIAGNOSIS — Z00.00 ANNUAL PHYSICAL EXAM: Primary | ICD-10-CM

## 2024-09-13 DIAGNOSIS — M67.432 GANGLION CYST OF WRIST, LEFT: ICD-10-CM

## 2024-09-13 DIAGNOSIS — Z00.00 HEALTHCARE MAINTENANCE: ICD-10-CM

## 2024-09-13 DIAGNOSIS — E78.2 MIXED HYPERLIPIDEMIA: ICD-10-CM

## 2024-09-13 PROBLEM — G89.29 CHRONIC RIGHT SHOULDER PAIN: Status: RESOLVED | Noted: 2023-10-13 | Resolved: 2024-09-13

## 2024-09-13 PROBLEM — M25.511 CHRONIC RIGHT SHOULDER PAIN: Status: RESOLVED | Noted: 2023-10-13 | Resolved: 2024-09-13

## 2024-09-13 LAB
ALBUMIN SERPL BCP-MCNC: 4 G/DL (ref 3.5–5.2)
ALP SERPL-CCNC: 85 U/L (ref 55–135)
ALT SERPL W/O P-5'-P-CCNC: 43 U/L (ref 10–44)
ANION GAP SERPL CALC-SCNC: 6 MMOL/L (ref 8–16)
AST SERPL-CCNC: 28 U/L (ref 10–40)
BILIRUB SERPL-MCNC: 0.9 MG/DL (ref 0.1–1)
BUN SERPL-MCNC: 14 MG/DL (ref 6–20)
CALCIUM SERPL-MCNC: 9.3 MG/DL (ref 8.7–10.5)
CHLORIDE SERPL-SCNC: 106 MMOL/L (ref 95–110)
CHOLEST SERPL-MCNC: 186 MG/DL (ref 120–199)
CHOLEST/HDLC SERPL: 3.6 {RATIO} (ref 2–5)
CO2 SERPL-SCNC: 28 MMOL/L (ref 23–29)
COMPLEXED PSA SERPL-MCNC: 1.5 NG/ML (ref 0–4)
CREAT SERPL-MCNC: 1 MG/DL (ref 0.5–1.4)
CV STRESS BASE HR: 62 BPM
DIASTOLIC BLOOD PRESSURE: 75 MMHG
ERYTHROCYTE [DISTWIDTH] IN BLOOD BY AUTOMATED COUNT: 12.8 % (ref 11.5–14.5)
EST. GFR  (NO RACE VARIABLE): >60 ML/MIN/1.73 M^2
ESTIMATED AVG GLUCOSE: 105 MG/DL (ref 68–131)
GLUCOSE SERPL-MCNC: 96 MG/DL (ref 70–110)
HBA1C MFR BLD: 5.3 % (ref 4–5.6)
HCT VFR BLD AUTO: 46.2 % (ref 40–54)
HDLC SERPL-MCNC: 51 MG/DL (ref 40–75)
HDLC SERPL: 27.4 % (ref 20–50)
HGB BLD-MCNC: 15.4 G/DL (ref 14–18)
LDLC SERPL CALC-MCNC: 111.8 MG/DL (ref 63–159)
MCH RBC QN AUTO: 30.1 PG (ref 27–31)
MCHC RBC AUTO-ENTMCNC: 33.3 G/DL (ref 32–36)
MCV RBC AUTO: 90 FL (ref 82–98)
NONHDLC SERPL-MCNC: 135 MG/DL
OHS CV CPX 1 MINUTE RECOVERY HEART RATE: 150 BPM
OHS CV CPX 85 PERCENT MAX PREDICTED HEART RATE MALE: 141
OHS CV CPX ESTIMATED METS: 17
OHS CV CPX MAX PREDICTED HEART RATE: 166
OHS CV CPX PATIENT IS FEMALE: 0
OHS CV CPX PATIENT IS MALE: 1
OHS CV CPX PEAK DIASTOLIC BLOOD PRESSURE: 84 MMHG
OHS CV CPX PEAK HEAR RATE: 162 BPM
OHS CV CPX PEAK RATE PRESSURE PRODUCT: NORMAL
OHS CV CPX PEAK SYSTOLIC BLOOD PRESSURE: 235 MMHG
OHS CV CPX PERCENT MAX PREDICTED HEART RATE ACHIEVED: 98
OHS CV CPX RATE PRESSURE PRODUCT PRESENTING: 9796
PLATELET # BLD AUTO: 213 K/UL (ref 150–450)
PMV BLD AUTO: 10.2 FL (ref 9.2–12.9)
POTASSIUM SERPL-SCNC: 3.9 MMOL/L (ref 3.5–5.1)
PROT SERPL-MCNC: 7 G/DL (ref 6–8.4)
RBC # BLD AUTO: 5.11 M/UL (ref 4.6–6.2)
SODIUM SERPL-SCNC: 140 MMOL/L (ref 136–145)
STRESS ECHO POST EXERCISE DUR MIN: 10 MINUTES
STRESS ECHO POST EXERCISE DUR SEC: 0 SECONDS
SYSTOLIC BLOOD PRESSURE: 158 MMHG
TRIGL SERPL-MCNC: 116 MG/DL (ref 30–150)
TSH SERPL DL<=0.005 MIU/L-ACNC: 2.26 UIU/ML (ref 0.4–4)
WBC # BLD AUTO: 5.66 K/UL (ref 3.9–12.7)

## 2024-09-13 PROCEDURE — 99999 PR PBB SHADOW E&M-EST. PATIENT-LVL I: CPT | Mod: PBBFAC,,,

## 2024-09-13 PROCEDURE — 83036 HEMOGLOBIN GLYCOSYLATED A1C: CPT | Performed by: STUDENT IN AN ORGANIZED HEALTH CARE EDUCATION/TRAINING PROGRAM

## 2024-09-13 PROCEDURE — 93017 CV STRESS TEST TRACING ONLY: CPT

## 2024-09-13 PROCEDURE — 80053 COMPREHEN METABOLIC PANEL: CPT | Performed by: STUDENT IN AN ORGANIZED HEALTH CARE EDUCATION/TRAINING PROGRAM

## 2024-09-13 PROCEDURE — 99999 PR PBB SHADOW E&M-EST. PATIENT-LVL III: CPT | Mod: PBBFAC,,, | Performed by: STUDENT IN AN ORGANIZED HEALTH CARE EDUCATION/TRAINING PROGRAM

## 2024-09-13 PROCEDURE — 84443 ASSAY THYROID STIM HORMONE: CPT | Performed by: STUDENT IN AN ORGANIZED HEALTH CARE EDUCATION/TRAINING PROGRAM

## 2024-09-13 PROCEDURE — 80061 LIPID PANEL: CPT | Performed by: STUDENT IN AN ORGANIZED HEALTH CARE EDUCATION/TRAINING PROGRAM

## 2024-09-13 PROCEDURE — 97750 PHYSICAL PERFORMANCE TEST: CPT | Mod: S$GLB,,, | Performed by: INTERNAL MEDICINE

## 2024-09-13 PROCEDURE — 84153 ASSAY OF PSA TOTAL: CPT | Performed by: STUDENT IN AN ORGANIZED HEALTH CARE EDUCATION/TRAINING PROGRAM

## 2024-09-13 PROCEDURE — 93018 CV STRESS TEST I&R ONLY: CPT | Mod: ,,, | Performed by: INTERNAL MEDICINE

## 2024-09-13 PROCEDURE — 93016 CV STRESS TEST SUPVJ ONLY: CPT | Mod: ,,, | Performed by: INTERNAL MEDICINE

## 2024-09-13 PROCEDURE — 85027 COMPLETE CBC AUTOMATED: CPT | Performed by: STUDENT IN AN ORGANIZED HEALTH CARE EDUCATION/TRAINING PROGRAM

## 2024-09-13 NOTE — ASSESSMENT & PLAN NOTE
Lab Results   Component Value Date    CHOL 186 09/13/2024    CHOL 208 (H) 06/23/2023    CHOL 177 08/26/2022     Lab Results   Component Value Date    HDL 51 09/13/2024    HDL 54 06/23/2023    HDL 48 08/26/2022     Lab Results   Component Value Date    LDLCALC 111.8 09/13/2024    LDLCALC 135.0 06/23/2023    LDLCALC 107.4 08/26/2022     Lab Results   Component Value Date    TRIG 116 09/13/2024    TRIG 95 06/23/2023    TRIG 108 08/26/2022       Lab Results   Component Value Date    CHOLHDL 27.4 09/13/2024    CHOLHDL 26.0 06/23/2023    CHOLHDL 27.1 08/26/2022     Continue healthy lifestyle and diet  Continue physical activity

## 2024-09-13 NOTE — ASSESSMENT & PLAN NOTE
Health care maintenance and core gaps reviewed and assessed with patient.  Vaccinations recommended:        Tetanus - 2015       Shingles - O       Influenza - O       Pneumonia -N/A   Colon cancer screening:   Colonoscopy: 2020  Lifestyle recommendations given.  Physical activity recommended.    Legend: Ordered (O), Declined (D), Current (C)

## 2024-09-13 NOTE — PROGRESS NOTES
Subjective:       Patient ID: Enrique Jimenez is a 53 y.o. male.    Chief Complaint: [employer] ( 3 kilos more)    HPI    Enrique Jimenez is a 53 y.o. male , English speaking, with a history of:  HLD  H/o lypoma  Left wrist ganglion      [Local Patient]  Originally from [facility]  Lives in: [place] [address]       Patient comes to the clinic for a general physical exam (Annual Wellness Visit) through [facility].    Patient is currently asymptomatic and has no complaints.    He admits he has not been watching his diet but he continues to be very physically active.  He exercises almost every day, he does weights, cardio, he likes to bike and he does some rowing.    Last year he was treated for a shoulder pain, but that resolved.    The little ganglion cyst of the left wrist has not changed    Patient denies CV symptoms, CP, SOB, palpitations.  Patient denies constitutional symptoms, fever, changes in the urine or stool.    No other complaints      Latest PCP visits:      06/23/23 AWV - Encounter to establish care    Changes in health or medications: No    Specialists visits and recommendations:        H/o ER or Urgent care visits:   NO    H/o Hospitalizations:  NO    H/o falls: None     Life events / lifestyle:   Nothing new      Most recent / available laboratories and studies reviewed with the patient:    Recent Labs   Lab 08/26/22  0801 06/23/23  0711 09/13/24  0759   WBC 5.95 5.65 5.66   Hemoglobin 16.5 16.0 15.4   Hematocrit 47.0 46.8 46.2   MCV 90 90 90   Platelets 236 217 213       Recent Labs   Lab 09/17/21  0916 08/26/22  0801 06/23/23  0711 09/13/24  0759   Glucose 103 96 91 96   Sodium 141 138 142 140   Potassium 4.2 4.3 4.8 3.9   BUN 13 13 14 14   Creatinine 1.0 1.0 1.1 1.0   eGFR if non  >60.0  --   --   --    Total Bilirubin 1.0 1.0 1.2 H 0.9   AST 24 25 25 28   ALT 37 35 36 43       Recent Labs   Lab 08/26/22  0801 06/23/23  0711 09/13/24  0759   Hemoglobin A1C 5.4 5.2 5.3        Recent Labs   Lab 08/26/22  0801 06/23/23  0711 09/13/24  0759   Cholesterol 177 208 H 186   Triglycerides 108 95 116   HDL 48 54 51   LDL Cholesterol 107.4 135.0 111.8   Non-HDL Cholesterol 129 154 135       Recent Labs   Lab 09/17/21  0916 08/26/22  0801 06/23/23  0711   TSH 2.123 2.043 2.307   PSA, Screen 0.95 0.98 1.1       Recent Labs   Lab 08/26/22  0801   Hepatitis C Ab Negative       Results for orders placed or performed during the hospital encounter of 06/23/23   EKG 12-lead    Collection Time: 06/23/23  7:26 AM    Narrative    Test Reason : Z00.00,    Vent. Rate : 060 BPM     Atrial Rate : 060 BPM     P-R Int : 152 ms          QRS Dur : 090 ms      QT Int : 386 ms       P-R-T Axes : 072 043 041 degrees     QTc Int : 386 ms    Normal sinus rhythm  Normal ECG  When compared with ECG of 26-AUG-2022 08:01,  No significant change was found  Confirmed by Edmund Arvizu MD ([id number]) on 6/23/2023 11:23:01 AM    Referred By: THERESA DOUGLAS           Confirmed By:Edmund Arvizu MD       Exercise Stress - EKG    The ECG portion of the study is negative for ischemia.    The patient reported no chest pain during the stress test.    There were no arrhythmias during stress.    The exercise capacity was excellent.    There was a hypertensive blood pressure response with stress.       Current medications:  No current outpatient medications on file.      Healthcare Maintenance:  Colon cancer screening:         Vaccinations:        Tetanus: 2015       Pneumonia: n/a       Zoster: no       Influenza: no       COVID vaccination: completed x 4    Depression screening: PHQ2 score = 0    Annual Wellness visit approx. Month: SEPTEMBER    Past Medical History reviewed and updated:    Past Medical History:   Diagnosis Date    Family history of prostate cancer 4/13/2017    Father and brother.    Mixed hyperlipidemia 11/25/2014       Past Surgical History:   Procedure Laterality Date    LIPOMA RESECTION         Family History  "  Problem Relation Name Age of Onset    Cancer Father  77        prostate cancer    Heart disease Father      Cancer Brother  58        Prostate cancer.    Heart disease Mother         ROS  11-point review of systems done. Negative except for detailed in the HPI.        Objective:      /79   Pulse 81   Ht 5' 8" (1.727 m)   Wt 82.6 kg (182 lb 1.6 oz)   BMI 27.69 kg/m²      Physical Exam   General appearance: Good general aspect, NAD, conversant   Eyes and HEENT: Normal sclerae, moist mucous membranes, no thyromegaly or lymphadenopathy  Respiratory: No work of breathing, clear to auscultation bilaterally. No rales, rhonchi, wheezing, or rubs.  Cardiovascular: PMI not displaced. RRR. Normal S1, S2. No murmurs, rubs or gallops.  Abdomen and : Soft, non-tender, nondistended, BS, no hepatosplenomegaly, no masses.  Extremities: no edemas, no extremity lymphadenopathy, no clubbing, no cyanosis.  Nervous System: Alert and oriented x 3, good concentration, no deficits.  Skin: Normal temperature, turgor and texture; no rash, ulcers or subcutaneous nodules  Psych: Appropriate affect, alert and oriented to person, place and time          Assessment:       1. Routine general medical examination at a health care facility  Assessment & Plan:  Patient is in overall good general health.  Stable medical conditions listed on the PMH.  Labs reviewed and patient notified.        2. Mixed hyperlipidemia  Assessment & Plan:  Lab Results   Component Value Date    CHOL 186 09/13/2024    CHOL 208 (H) 06/23/2023    CHOL 177 08/26/2022     Lab Results   Component Value Date    HDL 51 09/13/2024    HDL 54 06/23/2023    HDL 48 08/26/2022     Lab Results   Component Value Date    LDLCALC 111.8 09/13/2024    LDLCALC 135.0 06/23/2023    LDLCALC 107.4 08/26/2022     Lab Results   Component Value Date    TRIG 116 09/13/2024    TRIG 95 06/23/2023    TRIG 108 08/26/2022       Lab Results   Component Value Date    CHOLHDL 27.4 09/13/2024    " CHOLHDL 26.0 06/23/2023    CHOLHDL 27.1 08/26/2022     Continue healthy lifestyle and diet  Continue physical activity      3. Healthcare maintenance  Assessment & Plan:  Health care maintenance and core gaps reviewed and assessed with patient.  Vaccinations recommended:        Tetanus - 2015       Shingles - O       Influenza - O       Pneumonia -N/A   Colon cancer screening:   Colonoscopy: 2020  Lifestyle recommendations given.  Physical activity recommended.    Legend: Ordered (O), Declined (D), Current (C)      Orders:  -     Zoster Recombinant Vaccine; Future    4. Ganglion cyst of wrist, left  Assessment & Plan:  Stable, unchanged  Continue observation         Summary of orders / plan:       Shingrix          Patient Instructions   Hacerse vacunar contra la varicela zoster (Shingrix)       Problem list updated  Medications reconciled    Education provided  Lifestyle recommendations given  AVS generated, explained, and sent to the patient.  RTC in : 1 year for annual wellness visit  Labs: Not ordered yet            THERESA DOUGLAS MD, MPH  Internal Medicine  [facility]  [facility]

## 2024-09-13 NOTE — PROGRESS NOTES
Pt. Has no significant cardiovascular or pulmonary history.     Physical Limitations: Bursitis in R shoulder last year; had MRI and everything looked good, went to PT, no issues since    Current exercise routine: Patient currently lifts weights a few days a week and completed cardio a few days a week. Cardio 15-25 minutes walking, 15 minutes on bike, sometimes rower as well; about 15-60 min. of cardio. Strength training 3x a week, normally, sometimes post cardio for about 10 minutes.    Goals: Continue working out  Notes:     The fitness evaluation results are as follows:   Height (in): 67.5   Weight (lbs): 180.9   BMI: 27.97775   Body Fat (%): 24.90   Waist (cm): 92   RBP (mmHg): 110/70   RHR (bpm): 72    Dominant: 130    Non Dominant: 120   Push-up: 38   Curl-up:  75   Flexibility (cm): 30     Age/gender stratified assessment:   Resting BP: Within Normal Limits   Body Fat %: Excellent   Waist Circumfernece: Low Risk    Strength Dominant: 90th     Strength Non Dominant: 90th   Upper Body Endurance: Excellent   Abdominal Endurance: Well Above Average   Lower body Flexibiltiy: Very Good     Recommended fitness guidelines:    Cardiovascular   Perform 30-60min/day 5x/wk (>150min/wk) of moderate (109-128bpm) intensity exercise. More vigorous aerobics (>137bpm) can maintain or improve cardiovascular health with at least 75min/wk.  Recommended to continue regular aerobic training routine in order to meet above guidelines.              Strength Perform 2-4 sets of 10-20 repetitions at moderate intensity 2-4x/wk. for each muscle group.  Recommended to continue resistance training program to maintain or increase muscular strength and endurance.               Flexibility   Perform 2-4 stretches for 30s for each muscle group daily for best results.  Daily stretching should be performed in order to maintain or increase current level of flexibility.

## 2025-05-15 ENCOUNTER — OFFICE VISIT (OUTPATIENT)
Dept: UROLOGY | Facility: CLINIC | Age: 55
End: 2025-05-15
Payer: COMMERCIAL

## 2025-05-15 VITALS
HEART RATE: 68 BPM | WEIGHT: 183.19 LBS | BODY MASS INDEX: 27.86 KG/M2 | SYSTOLIC BLOOD PRESSURE: 138 MMHG | DIASTOLIC BLOOD PRESSURE: 84 MMHG

## 2025-05-15 DIAGNOSIS — Z80.42 FAMILY HISTORY OF PROSTATE CANCER: Primary | ICD-10-CM

## 2025-05-15 DIAGNOSIS — N40.1 BENIGN PROSTATIC HYPERPLASIA WITH WEAK URINARY STREAM: ICD-10-CM

## 2025-05-15 DIAGNOSIS — R39.12 BENIGN PROSTATIC HYPERPLASIA WITH WEAK URINARY STREAM: ICD-10-CM

## 2025-05-15 PROCEDURE — 99214 OFFICE O/P EST MOD 30 MIN: CPT | Mod: S$GLB,,, | Performed by: STUDENT IN AN ORGANIZED HEALTH CARE EDUCATION/TRAINING PROGRAM

## 2025-05-15 PROCEDURE — 99999 PR PBB SHADOW E&M-EST. PATIENT-LVL II: CPT | Mod: PBBFAC,,, | Performed by: STUDENT IN AN ORGANIZED HEALTH CARE EDUCATION/TRAINING PROGRAM

## 2025-05-15 PROCEDURE — 3075F SYST BP GE 130 - 139MM HG: CPT | Mod: CPTII,S$GLB,, | Performed by: STUDENT IN AN ORGANIZED HEALTH CARE EDUCATION/TRAINING PROGRAM

## 2025-05-15 PROCEDURE — 3008F BODY MASS INDEX DOCD: CPT | Mod: CPTII,S$GLB,, | Performed by: STUDENT IN AN ORGANIZED HEALTH CARE EDUCATION/TRAINING PROGRAM

## 2025-05-15 PROCEDURE — 1159F MED LIST DOCD IN RCRD: CPT | Mod: CPTII,S$GLB,, | Performed by: STUDENT IN AN ORGANIZED HEALTH CARE EDUCATION/TRAINING PROGRAM

## 2025-05-15 PROCEDURE — 3079F DIAST BP 80-89 MM HG: CPT | Mod: CPTII,S$GLB,, | Performed by: STUDENT IN AN ORGANIZED HEALTH CARE EDUCATION/TRAINING PROGRAM

## 2025-05-15 RX ORDER — CLOTRIMAZOLE 1 %
CREAM (GRAM) TOPICAL 2 TIMES DAILY
Qty: 60 G | Refills: 0 | Status: SHIPPED | OUTPATIENT
Start: 2025-05-15 | End: 2025-06-14

## 2025-05-15 NOTE — PROGRESS NOTES
It was great to see Enrique today.    Enrique Jimenez is a pleasant 54 y.o. male presenting for management of family history of prostate cancer and BPH/LUTS.     History of Present Illness     - Patient has a strong family history of prostate cancer. He was last evaluated by Dr. Combs, a urologic oncologist, in January 2024 for prostate cancer screening. His PSA levels have been monitored, with the most recent result from 8 months ago showing an increase to 1.5 from 1.1 a year prior. He was offered genetic testing previously due to his family history but did not follow through with it, expressing skepticism about its value for his care.  -He reports a weaker urinary stream, noting that it now takes him 45 seconds to 1 minute to urinate, compared to 20 seconds previously. He denies changes in urinary frequency.   -He describes intermittent irritation on his penis for approximately 6 months, with changes in skin texture and the appearance of a my on the glans that comes and goes. He is uncircumcised, which may contribute to the irritation.   - He denies blood in urine, burning with urination, waking up at night to urinate, and constipation.      FAMILY HISTORY:  - Father: Metastatic prostate cancer  - Brothers: Prostate cancer            Past Medical History:   Diagnosis Date    Family history of prostate cancer 4/13/2017    Father and brother.    Mixed hyperlipidemia 11/25/2014      Past Surgical History:   Procedure Laterality Date    LIPOMA RESECTION         ROS: as per HPI    Tobacco Use History[1]     Physical Exam     General: No acute distress. Nontoxic appearing.  HENT: Normocephalic. Atraumatic.  Respiratory: Normal respiratory effort. No conversational dyspnea. No audible wheezing.  Abdomen: No obvious distension.  Skin: No visible abnormalities.  Extremities: No edema upper extremities. No edema lower extremities.  Neurological: Alert and oriented x3. Normal speech.  Psychiatric: Normal mood. Normal  affect. No evidence of SI.   : uncircumcised phallus. Small spot that appears to be a fungal infection on the head of the penis. No palpable plaques. Patent meatus without discharge. B/L testicles in scrotum, without masses or tenderness. No palpable hernias.   KANE: Prostate base palpable, no nodules, non-tender.       Data review  Prior internal/external notes have been reviewed: Yes  Care Everywhere reviewed for external records:  Yes    Pertinent labs and imaging independently reviewed include:   Lab Results   Component Value Date     09/13/2024    K 3.9 09/13/2024    CREATININE 1.0 09/13/2024    EGFRNORACEVR >60.0 09/13/2024     Lab Results   Component Value Date    HGBA1C 5.3 09/13/2024      Lab Results   Component Value Date    PSA 1.5 09/13/2024    PSA 1.1 06/23/2023    PSA 0.98 08/26/2022       Problems addressed during today's encounter  1. Family history of prostate cancer  Overview:  Father and brother.    Orders:  -     Prostate Specific Antigen, Diagnostic; Future; Expected date: 05/15/2025    2. Benign prostatic hyperplasia with weak urinary stream    Other orders  -     clotrimazole (LOTRIMIN) 1 % cream; Apply topically 2 (two) times daily.  Dispense: 60 g; Refill: 0         Plan for problems listed above includes:   Assessment & Plan    ELEVATED PSA AND FAMILY HISTORY OF PROSTATE CANCER:   Reviewed strong family history of prostate cancer.   Noted PSA increase from 1.1 to 1.5     Considered MRI prostate if PSA trajectory becomes suspicious.   Explained potential benefits of genetic testing for prostate cancer risk assessment and counseling. Patient declines.   Described process of MRI fusion biopsy for targeted prostate exam.   Explained PSA is a noisy measurement and typically rises gradually over time, and sexual activity can artificially elevate PSA.   Patient to abstain from sexual activity for 48 hours before PSA test.   Ordered PSA test.    FUNGAL INFECTION OF PENIS:   Assessed mild  irritation on penis, likely fungal infection.   Discussed importance of keeping genital area dry to prevent fungal infections, particularly for uncircumcised men.   Patient to keep penile area dry to prevent fungal infections.   Started antifungal cream for penile irritation, to be used for 1 month.    URINARY SYMPTOMS:   Evaluated urinary symptoms, noting weaker stream but no significant frequency changes.   Discussed option of Flomax for urinary symptoms, but patient declined.    FOLLOW-UP:   Follow up in 6 months for PSA check.         Risk for nontreatment of mentioned condition(s) includes, but is not limited to:   Continuation or worsening of the current condition(s)  Possible missed diagnosis of malignancy    Further evaluation ordered today:   Lab work    This note was generated with the assistance of ambient listening technology. Verbal consent was obtained by the patient and accompanying visitor(s) for the recording of patient appointment to facilitate this note. I attest to having reviewed and edited the generated note for accuracy, though some syntax or spelling errors may persist. Please contact the author of this note for any clarification.     Issac Mckeon MD             [1]   Social History  Tobacco Use   Smoking Status Never   Smokeless Tobacco Not on file

## 2025-07-09 DIAGNOSIS — Z13.6 ENCOUNTER FOR SCREENING FOR CARDIOVASCULAR DISORDERS: Primary | ICD-10-CM

## 2025-08-18 DIAGNOSIS — Z13.6 ENCOUNTER FOR SCREENING FOR CARDIOVASCULAR DISORDERS: Primary | ICD-10-CM

## 2025-08-20 ENCOUNTER — CLINICAL SUPPORT (OUTPATIENT)
Dept: INTERNAL MEDICINE | Facility: CLINIC | Age: 55
End: 2025-08-20
Attending: STUDENT IN AN ORGANIZED HEALTH CARE EDUCATION/TRAINING PROGRAM
Payer: COMMERCIAL

## 2025-08-20 ENCOUNTER — HOSPITAL ENCOUNTER (OUTPATIENT)
Dept: RADIOLOGY | Facility: HOSPITAL | Age: 55
Discharge: HOME | End: 2025-08-20
Attending: STUDENT IN AN ORGANIZED HEALTH CARE EDUCATION/TRAINING PROGRAM
Payer: COMMERCIAL

## 2025-08-20 VITALS
OXYGEN SATURATION: 97 % | BODY MASS INDEX: 27.5 KG/M2 | HEIGHT: 68 IN | DIASTOLIC BLOOD PRESSURE: 84 MMHG | HEART RATE: 60 BPM | SYSTOLIC BLOOD PRESSURE: 126 MMHG | WEIGHT: 181.44 LBS

## 2025-08-20 DIAGNOSIS — Z00.00 ROUTINE GENERAL MEDICAL EXAMINATION AT A HEALTH CARE FACILITY: Primary | ICD-10-CM

## 2025-08-20 DIAGNOSIS — Z13.6 ENCOUNTER FOR SCREENING FOR CARDIOVASCULAR DISORDERS: ICD-10-CM

## 2025-08-20 DIAGNOSIS — Z00.00 ANNUAL PHYSICAL EXAM: Primary | ICD-10-CM

## 2025-08-20 PROBLEM — N52.01 ERECTILE DYSFUNCTION DUE TO ARTERIAL INSUFFICIENCY: Status: RESOLVED | Noted: 2018-02-15 | Resolved: 2025-08-20

## 2025-08-20 LAB
25(OH)D3+25(OH)D2 SERPL-MCNC: 33 NG/ML (ref 30–96)
ALBUMIN SERPL BCP-MCNC: 4.7 G/DL (ref 3.5–5.2)
ALP SERPL-CCNC: 88 UNIT/L (ref 40–150)
ALT SERPL W/O P-5'-P-CCNC: 44 UNIT/L (ref 0–55)
ANION GAP (OHS): 14 MMOL/L (ref 8–16)
AST SERPL-CCNC: 35 UNIT/L (ref 0–50)
BILIRUB SERPL-MCNC: 1 MG/DL (ref 0.1–1)
BUN SERPL-MCNC: 15 MG/DL (ref 6–20)
CALCIUM SERPL-MCNC: 9.8 MG/DL (ref 8.7–10.5)
CHLORIDE SERPL-SCNC: 107 MMOL/L (ref 95–110)
CHOLEST SERPL-MCNC: 211 MG/DL (ref 120–199)
CHOLEST/HDLC SERPL: 3.9 {RATIO} (ref 2–5)
CO2 SERPL-SCNC: 23 MMOL/L (ref 23–29)
CREAT SERPL-MCNC: 1.3 MG/DL (ref 0.5–1.4)
EAG (OHS): 105 MG/DL (ref 68–131)
ERYTHROCYTE [DISTWIDTH] IN BLOOD BY AUTOMATED COUNT: 12.5 % (ref 11.5–14.5)
GFR SERPLBLD CREATININE-BSD FMLA CKD-EPI: >60 ML/MIN/1.73/M2
GLUCOSE SERPL-MCNC: 107 MG/DL (ref 70–110)
HBA1C MFR BLD: 5.3 % (ref 4–5.6)
HCT VFR BLD AUTO: 50.4 % (ref 40–54)
HDLC SERPL-MCNC: 54 MG/DL (ref 40–75)
HDLC SERPL: 25.6 % (ref 20–50)
HGB BLD-MCNC: 16.4 GM/DL (ref 14–18)
LDLC SERPL CALC-MCNC: 136.6 MG/DL (ref 63–159)
MCH RBC QN AUTO: 29.8 PG (ref 27–31)
MCHC RBC AUTO-ENTMCNC: 32.5 G/DL (ref 32–36)
MCV RBC AUTO: 92 FL (ref 82–98)
NONHDLC SERPL-MCNC: 157 MG/DL
PLATELET # BLD AUTO: 234 K/UL (ref 150–450)
PMV BLD AUTO: 9.6 FL (ref 9.2–12.9)
POTASSIUM SERPL-SCNC: 4.4 MMOL/L (ref 3.5–5.1)
PROT SERPL-MCNC: 8 GM/DL (ref 6–8.4)
PSA SERPL-MCNC: 1.54 NG/ML
RBC # BLD AUTO: 5.51 M/UL (ref 4.6–6.2)
SODIUM SERPL-SCNC: 144 MMOL/L (ref 136–145)
TRIGL SERPL-MCNC: 102 MG/DL (ref 30–150)
TSH SERPL-ACNC: 2.28 UIU/ML (ref 0.4–4)
WBC # BLD AUTO: 6.08 K/UL (ref 3.9–12.7)

## 2025-08-20 PROCEDURE — 82306 VITAMIN D 25 HYDROXY: CPT

## 2025-08-20 PROCEDURE — 99999 PR PBB SHADOW E&M-EST. PATIENT-LVL III: CPT | Mod: PBBFAC,,, | Performed by: FAMILY MEDICINE

## 2025-08-20 PROCEDURE — 84443 ASSAY THYROID STIM HORMONE: CPT

## 2025-08-20 PROCEDURE — 3079F DIAST BP 80-89 MM HG: CPT | Mod: CPTII,S$GLB,, | Performed by: FAMILY MEDICINE

## 2025-08-20 PROCEDURE — 84153 ASSAY OF PSA TOTAL: CPT

## 2025-08-20 PROCEDURE — 82040 ASSAY OF SERUM ALBUMIN: CPT

## 2025-08-20 PROCEDURE — 99396 PREV VISIT EST AGE 40-64: CPT | Mod: S$GLB,,, | Performed by: FAMILY MEDICINE

## 2025-08-20 PROCEDURE — 99999 PR PBB SHADOW E&M-EST. PATIENT-LVL I: CPT | Mod: PBBFAC,,,

## 2025-08-20 PROCEDURE — 75571 CT HRT W/O DYE W/CA TEST: CPT | Mod: TC

## 2025-08-20 PROCEDURE — 1160F RVW MEDS BY RX/DR IN RCRD: CPT | Mod: CPTII,S$GLB,, | Performed by: FAMILY MEDICINE

## 2025-08-20 PROCEDURE — 3008F BODY MASS INDEX DOCD: CPT | Mod: CPTII,S$GLB,, | Performed by: FAMILY MEDICINE

## 2025-08-20 PROCEDURE — 3074F SYST BP LT 130 MM HG: CPT | Mod: CPTII,S$GLB,, | Performed by: FAMILY MEDICINE

## 2025-08-20 PROCEDURE — 85027 COMPLETE CBC AUTOMATED: CPT

## 2025-08-20 PROCEDURE — 97750 PHYSICAL PERFORMANCE TEST: CPT | Mod: S$GLB,,, | Performed by: INTERNAL MEDICINE

## 2025-08-20 PROCEDURE — 3044F HG A1C LEVEL LT 7.0%: CPT | Mod: CPTII,S$GLB,, | Performed by: FAMILY MEDICINE

## 2025-08-20 PROCEDURE — 80061 LIPID PANEL: CPT

## 2025-08-20 PROCEDURE — 1159F MED LIST DOCD IN RCRD: CPT | Mod: CPTII,S$GLB,, | Performed by: FAMILY MEDICINE

## 2025-08-20 PROCEDURE — 83036 HEMOGLOBIN GLYCOSYLATED A1C: CPT

## 2025-08-20 RX ORDER — SOD SULF/POT CHLORIDE/MAG SULF 1.479 G
TABLET ORAL
COMMUNITY
Start: 2025-08-18 | End: 2025-08-20